# Patient Record
Sex: FEMALE | Race: WHITE
[De-identification: names, ages, dates, MRNs, and addresses within clinical notes are randomized per-mention and may not be internally consistent; named-entity substitution may affect disease eponyms.]

---

## 2016-12-26 VITALS
SYSTOLIC BLOOD PRESSURE: 124 MMHG | DIASTOLIC BLOOD PRESSURE: 68 MMHG | DIASTOLIC BLOOD PRESSURE: 68 MMHG | SYSTOLIC BLOOD PRESSURE: 124 MMHG

## 2017-03-27 ENCOUNTER — HOSPITAL ENCOUNTER (OUTPATIENT)
Dept: HOSPITAL 44 - LAB | Age: 82
End: 2017-03-27
Attending: INTERNAL MEDICINE
Payer: MEDICARE

## 2017-03-27 DIAGNOSIS — E11.9: Primary | ICD-10-CM

## 2017-03-27 LAB
EGFR (AFRICAN): > 60
EGFR (NON-AFRICAN): > 60

## 2017-03-27 PROCEDURE — 83036 HEMOGLOBIN GLYCOSYLATED A1C: CPT

## 2017-03-27 PROCEDURE — 36415 COLL VENOUS BLD VENIPUNCTURE: CPT

## 2017-03-27 PROCEDURE — 80048 BASIC METABOLIC PNL TOTAL CA: CPT

## 2017-06-02 ENCOUNTER — HOSPITAL ENCOUNTER (OUTPATIENT)
Dept: HOSPITAL 44 - LAB | Age: 82
End: 2017-06-02
Attending: INTERNAL MEDICINE
Payer: MEDICARE

## 2017-06-02 DIAGNOSIS — R10.84: Primary | ICD-10-CM

## 2017-06-02 DIAGNOSIS — K21.0: ICD-10-CM

## 2017-06-02 DIAGNOSIS — I48.2: ICD-10-CM

## 2017-06-02 DIAGNOSIS — M15.9: ICD-10-CM

## 2017-06-02 DIAGNOSIS — I25.9: ICD-10-CM

## 2017-06-02 LAB
BASOPHILS NFR BLD: 0.7 % (ref 0–1.5)
EGFR (AFRICAN): > 60
EGFR (NON-AFRICAN): > 60
EOSINOPHIL NFR BLD: 2.1 % (ref 0–6.8)
MCH RBC QN AUTO: 32 PG (ref 28–34)
MCV RBC AUTO: 92.6 FL (ref 80–100)
MONOCYTES %: 5.5 % (ref 0–11)
NEUTROPHILS #: 3.8 # K/UL (ref 1.4–7.7)

## 2017-06-02 PROCEDURE — 80061 LIPID PANEL: CPT

## 2017-06-02 PROCEDURE — 85025 COMPLETE CBC W/AUTO DIFF WBC: CPT

## 2017-06-02 PROCEDURE — 84443 ASSAY THYROID STIM HORMONE: CPT

## 2017-06-02 PROCEDURE — 83880 ASSAY OF NATRIURETIC PEPTIDE: CPT

## 2017-06-02 PROCEDURE — 36415 COLL VENOUS BLD VENIPUNCTURE: CPT

## 2017-06-02 PROCEDURE — 80053 COMPREHEN METABOLIC PANEL: CPT

## 2017-08-23 ENCOUNTER — HOSPITAL ENCOUNTER (EMERGENCY)
Dept: HOSPITAL 44 - ED | Age: 82
Discharge: HOME | End: 2017-08-23
Payer: MEDICARE

## 2017-08-23 VITALS — SYSTOLIC BLOOD PRESSURE: 155 MMHG | DIASTOLIC BLOOD PRESSURE: 58 MMHG
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
## 2017-08-23 DIAGNOSIS — S62.645B: Primary | ICD-10-CM

## 2017-08-23 PROCEDURE — S1016 NON-PVC INTRAVENOUS ADMINIST: HCPCS

## 2017-08-23 PROCEDURE — 12001 RPR S/N/AX/GEN/TRNK 2.5CM/<: CPT

## 2017-08-23 PROCEDURE — 99283 EMERGENCY DEPT VISIT LOW MDM: CPT

## 2017-08-23 PROCEDURE — 96365 THER/PROPH/DIAG IV INF INIT: CPT

## 2017-08-23 PROCEDURE — 73140 X-RAY EXAM OF FINGER(S): CPT

## 2017-08-23 PROCEDURE — 90471 IMMUNIZATION ADMIN: CPT

## 2017-08-23 NOTE — DIAGNOSTIC IMAGING REPORT
CYNTHIA STEINBERG (ZHANNA) - ER 

[facility]

[address] [facility].[address] [address]

 

 

 

 

Report Submission Date: Aug 23, 2017 8:06:53 PM CDT

Patient       Study

Name:   ANASTACIA RONDON       Date:   Aug 23, 2017 7:39:03 PM CDT

MRN:   [id number]       Modality Type:   CR

Gender:   F       Description:   UPPER EXTREMITY

:   39       Institution:   [facility]

Physician:   CYNTHIA STEINBERG (ZHANNA) - ER

         

 

 

Examination: Plain film finger 



History: Injury 



Comparison exams: None available 



Findings: 3 views the finger demonstrates a transverse lucency involving the 
distal phalanx of the 4th digit. Articular degenerative changes. No other acute 
osseous or soft tissue abnormality. 



Impression: Transverse fracture distal phalanx 4th digit.

 

Electronically signed on Aug 23, 2017 8:06:53 PM CDT by:

Carlos RUST

## 2017-08-23 NOTE — ED PHYSICIAN DOCUMENTATION
Upper Extremity Injury





- HISTORIAN


Historian: patient, child





- HPI


Stated Complaint: Left ring finger laceration


Chief Complaint: Upper Extremity Injury


Onset: just prior to arrival


Context: blow


Modifying Factors: pain on movement


Further Comments: yes (82 year old female patient brought in by family for 

evaluation of left ring finger.  Family reports patient closed finger in car 

door.  C/O severe pain.  Last tetanus 4/2016.)





- ROS


CONST: recent illness (shoulder injury)


CVS/RESP: none


NEURO: none


MS/SKIN/LYMPH: none


GI/: denies: nausea, vomiting





- PAST HX


Past History: Rt handed, diabetes Type 2, other (OA, COPD, GERD)


Allergies/Adverse Reactions: 


 Allergies











Allergy/AdvReac Type Severity Reaction Status Date / Time


 


No Known Allergies Allergy   Verified 08/23/17 20:28














Home Medications: 


 Ambulatory Orders











 Medication  Instructions  Recorded


 


Multivitamin [Tab-A-Chandan] 1 each PO DAILY 01/24/15


 


Budesonide [Pulmicort] 0.5 mg IH D 07/26/15


 


Pramipexole Di-HCl [Mirapex] 0.25 mg PO BID 07/26/15


 


Ranitidine HCl [Zantac] 150 mg PO D 07/26/15


 


Folic Acid [FA-8] 0.8 mg PO D 02/22/16


 


Metformin HCl [Metformin HCl ER] 1,000 mg PO HS 12/26/16


 


Atorvastatin Calcium [Lipitor] 20 mg PO HS 08/23/17


 


Cephalexin [Keflex] 500 mg PO QID #40 capsule 08/23/17


 


Cyanocobalamin [Vitamin B-12] 1,000 mcg PO BID 08/23/17


 


Gabapentin [Neurontin] 300 mg PO HS 08/23/17


 


Metformin HCl [Glucophage] 500 mg PO BID 08/23/17


 


Mupirocin [Bactroban] 1 appl TP BID #1 tube 08/23/17














- SOCIAL HX


Smoking History: non-smoker





- FAMILY HX


Family History: denies: none





- VITAL SIGNS


Vital Signs: 





 Vital Signs











Temp Pulse Resp BP Pulse Ox


 


    86   22   155/58   96 


 


    08/23/17 21:15  08/23/17 21:15  08/23/17 21:15  08/23/17 21:15














- REVIEWED ASSESSMENTS


Nursing Assessment  Reviewed: Yes


Vitals Reviewed: Yes





Procedures


Wound Location: other (right 4th digit)


Wound Length: 2


Wound's Depth, Shape: other ("C" shape)


Wound Explored: clean


Irrigated w/ Saline (ccs): 1,000


Betadine Prep?: No (chlorhexidine)


Anesthesia: 1% Lidocaine (digital block at 1915), Other (2030 - Marcaine .5% 

digital block )


Suture Size/Type: 5:0


Number of Sutures: 6


Deep Layer Suture Size/Type: 5:0


Sterile Dressing Applied?: Yes


Splint Applied?: Yes


Progress: 





2010 Consult with [facility] Dr Griffin, agrees with wash out, 

antibiotics, closure and splint.  Have patient follow up on Monday at 8:30 in 

office. 





Patient tolerated procedure well, flexion and extension intact at DIP joint of 

4th phalanx.  No tendons visualized. 





Edges well approximated. Dressing and splint applied by nursing.  Reviewed 

wound care instructions with patient and family .





ED Results Lab/Radiology





- Radiology


Radiology Impressions: 





 


Examination: Plain film finger





History: Injury





Comparison exams: None available





Findings: 3 views the finger demonstrates a transverse lucency involving the 

distal phalanx of the 4th digit. Articular degenerative changes. No other acute 

osseous or soft tissue abnormality.





Impression: Transverse fracture distal phalanx 4th digit.


 


Electronically signed on Aug 23, 2017 8:06:53 PM CDT by:


Carlos Mckeon





- Orders


Orders: 





 ED Orders











 Category Date Time Status


 


 Cleanse with NS and Chlorhexid 1T Care  08/23/17 19:10 Active


 


 FINGER 2 VIEWS OR MORE [RAD] Stat Exams  08/23/17 Completed


 


 Bupivacaine HCl/Pf [Marcaine 0.5%] Med  08/23/17 20:17 Discontinued





 5 mg IJ NOW ONE   


 


 Diph,Pertuss(Acell),Tet Vac/Pf [Adacel] Med  08/23/17 19:59 Discontinued





 0.5 ml IM .ONCE ONE   


 


 Lidocaine 1% 5ml(IM or SUTURE) [Xylocaine] Med  08/23/17 19:09 Discontinued





 50 mg IJ NOW ONE   


 


 Sodium Bicarbonate [Neut] Med  08/23/17 19:10 Discontinued





 2.4 meq INJ NOW ONE   


 


 ceFAZolin SODIUM [Ancef] 1 gm Med  08/23/17 19:59 Discontinued





 0.9 % Sodium Chloride [Sodium Chloride] 50 ml   





 IV NOW   














Upper Extremity Injury Physic





- Physical Exam


General Appearance: moderate distress


Hand: normal ROM, abrasions (Abrasion proximal to left 4th digit nail bed, 

volar aspect.), laceration (palmar aspect of left 4th digit.  ), stiffness, 

swelling


Wrist: normal inspection, non-tender, no evidence of injury, normal ROM


Elbow/Forearm: normal inspection, non-tender, no evidence of injury, normal ROM


Neuro/Vascular/Tendon: no vascular compromise, motor nml, sensation nml, ROM nml


Skin: warm,dry


Resp/CVS: chest non-tender, breath sounds nml, heart sounds nml, no resp. 

distress, lungs clear, reg. rate & rhythm


Abdomen: non-tender, pelvis stable





Discharge


Clincal Impression: 


Phalanx, distal fracture of finger


Qualifiers:


 Encounter type: initial encounter Finger: ring finger Fracture type: open 

Fracture alignment: nondisplaced Laterality: left Qualified Code(s): S62.665B - 

Nondisplaced fracture of distal phalanx of left ring finger, initial encounter 

for open fracture





Clincal Impression: 


 (Ruled Out): Closed fracture of phalanx of digit of hand


Prescriptions: 


Cephalexin [Keflex] 500 mg PO QID #40 capsule


Mupirocin [Bactroban] 1 appl TP BID #1 tube


Additional Instructions: 





Follow up Monday, September 4, 2017 at 8:30 with Dr Griffin





 Keep the wound clean and dry until it has healed.  





You can wash or shower after 24 hours.  Do not soak the wound in water and make 

sure it is dry afterwards (gently pat the area dry with a clean towel). Do not 

get into a swimming pool, hot tub, lake or river until your stitches are 

removed. 





 To remove your dressing,  gently pull it off.  If needed,  you can dampen it 

with water then gently pull it off. 





   Clean the laceration twice a day with hibiclens and rinse with water  clean 

away any scabbed area


   Apply thin coat of antibiotic ointment after cleaning the wound.


   Cover with non-adherent bandage if able.


 If you have pain, take simple pain relief medication such as Tylenol or 

ibuprofen.





 If bandages or dressings get wet, they will need to be changed. 





Have  your stitches removed at your doctors office in 7-10 days. 





Discharged with bactroban ointment  apply a thin coat twice a day. A 

prescription was sent to the pharmacy. 





Home Medications: 


Ambulatory Orders





Multivitamin [Tab-A-Chandan] 1 each PO DAILY 01/24/15 


Budesonide [Pulmicort] 0.5 mg IH D 07/26/15 


Pramipexole Di-HCl [Mirapex] 0.25 mg PO BID 07/26/15 


Ranitidine HCl [Zantac] 150 mg PO D 07/26/15 


Folic Acid [FA-8] 0.8 mg PO D 02/22/16 


Metformin HCl [Metformin HCl ER] 1,000 mg PO HS 12/26/16 


Atorvastatin Calcium [Lipitor] 20 mg PO HS 08/23/17 


Cephalexin [Keflex] 500 mg PO QID #40 capsule 08/23/17 


Cyanocobalamin [Vitamin B-12] 1,000 mcg PO BID 08/23/17 


Gabapentin [Neurontin] 300 mg PO HS 08/23/17 


Metformin HCl [Glucophage] 500 mg PO BID 08/23/17 


Mupirocin [Bactroban] 1 appl TP BID #1 tube 08/23/17 








Condition: Good


Disposition: 01 HOME, SELF-CARE


Decision to Admit: NO


Decision Time: 21:00

## 2017-11-04 ENCOUNTER — HOSPITAL ENCOUNTER (EMERGENCY)
Dept: HOSPITAL 44 - ED | Age: 82
Discharge: HOME | End: 2017-11-04
Payer: MEDICARE

## 2017-11-04 VITALS — DIASTOLIC BLOOD PRESSURE: 55 MMHG | SYSTOLIC BLOOD PRESSURE: 131 MMHG
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
## 2017-11-04 DIAGNOSIS — M25.512: Primary | ICD-10-CM
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
## 2017-11-04 DIAGNOSIS — M25.552: ICD-10-CM
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
## 2017-11-04 PROCEDURE — 99283 EMERGENCY DEPT VISIT LOW MDM: CPT

## 2017-11-04 NOTE — ED PHYSICIAN DOCUMENTATION
Fall





- HISTORIAN


Historian: patient, child





- HPI


Stated Complaint: fall/arm,thigh pain


Chief Complaint: Fall


Additional Information: 





pt went to sleep in chair awoke when stood up fell forward w/pain lt shoulder 

and lt hip.  had shoulder replacement this shoulder 6 weeks ago had been doingl 

well  had total llhip rt hip sev yrs ago.  had not lstood since fall ambulance 

delivery to [facility] at daughters request


Onset: just prior to arrival (approxd 1230), today


Where: home


Context: lost balance


r: mild


Associated Symptoms:: no loss of consciousness


Location of Pain/Injury: lower extremity.  denies: head, neck, face, chest, 

abdomen, upper back, mid back, lower back, L shoulder


Injury to Right Extremity: none


Injury to Left Extremity: shoulder, thigh





- ROS


CONST: no problems.  denies: recent illness, fever, sweating, weakness


NEURO: denies: dizziness, anxiety, depression


MS/SKIN/LYMPH: denies: weakness, numbness, neck pain, back pain, ankle swelling

, leg swelling


EYES/ENT: denies: problems with vision


CVS/RESP: none


GI/: denies: nausea





- PAST HX


Past History: cardiac disease, diabetes Type 1


Allergies/Adverse Reactions: 


 Allergies











Allergy/AdvReac Type Severity Reaction Status Date / Time


 


No Known Allergies Allergy   Verified 11/04/17 01:32














Home Medications: 


 Ambulatory Orders











 Medication  Instructions  Recorded


 


Multivitamin [Tab-A-Chandan] 1 each PO DAILY 01/24/15


 


Pramipexole Di-HCl [Mirapex] 0.25 mg PO BID 07/26/15


 


Ranitidine HCl [Zantac] 150 mg PO D 07/26/15


 


Folic Acid [FA-8] 0.8 mg PO D 02/22/16


 


Metformin HCl [Metformin HCl ER] 1,000 mg PO HS 12/26/16


 


Atorvastatin Calcium [Lipitor] 20 mg PO HS 08/23/17


 


Cyanocobalamin [Vitamin B-12] 1,000 mcg PO BID 08/23/17


 


Gabapentin [Neurontin] 300 mg PO HS 08/23/17


 


Mupirocin [Bactroban] 1 appl TP BID #1 tube 08/23/17














- SOCIAL HX


Smoking History: less than 1 pack/day


Alcohol Use: none


Drug Use: none





- FAMILY HX


Family History: no significant history





- VITAL SIGNS


Vital Signs: 





 Vital Signs











Temp Pulse Resp BP Pulse Ox


 


 98.2 F   76   16   131/55   95 


 


 11/04/17 01:25  11/04/17 01:25  11/04/17 01:25  11/04/17 01:25  11/04/17 01:25














- REVIEWED ASSESSMENTS


Nursing Assessment  Reviewed: Yes


Vitals Reviewed: Yes





Fall Physical Exam





- Physical Exam


General Appearance: mild distress


Head: non-tender, no swelling, no obvious injury


Neck: non-tender, painless ROM


Eye: DUSTIN, EOMI


ENT: nml external inspection


Resp/CVS: chest non-tender, breath sounds nml, heart sounds nml


Abdomen: soft, non-tender


Neuro: oriented x3, sensation nml, motor nml, mood/affect nml, other (pt can 

rom lt shoulder w/o difficulty and palpation is non tender and non remarkable 

also lt hip is non tende and can stand and walk "as good as usual" w/o sig pain 

anywhere)





Discharge


Clincal Impression: 


 fall w/ltshoulder and lt hip pain





Referrals: 


Johnnie Garcia MD [Primary Care Provider] - 2 Days


Comments: 





disc cond w/pt and quintin after exam they elect no xray at this time.  pt walked 

out of here w/o difficulty.  she walks part time w/walker at home.  daughter 

helped the pt by arm


Condition: Good


Disposition: 01 HOME, SELF-CARE


Decision to Admit: [id number]


Decision Time: 01:59

## 2017-11-12 VITALS — SYSTOLIC BLOOD PRESSURE: 156 MMHG | DIASTOLIC BLOOD PRESSURE: 75 MMHG

## 2017-11-14 ENCOUNTER — HOSPITAL ENCOUNTER (OUTPATIENT)
Dept: HOSPITAL 44 - RAD | Age: 82
End: 2017-11-14
Attending: FAMILY MEDICINE
Payer: MEDICARE

## 2017-11-14 DIAGNOSIS — E03.9: ICD-10-CM

## 2017-11-14 DIAGNOSIS — I48.2: ICD-10-CM

## 2017-11-14 DIAGNOSIS — R10.11: Primary | ICD-10-CM

## 2017-11-14 DIAGNOSIS — R10.84: ICD-10-CM

## 2017-11-14 DIAGNOSIS — J44.9: ICD-10-CM

## 2017-11-14 DIAGNOSIS — K21.0: ICD-10-CM

## 2017-11-14 DIAGNOSIS — I10: ICD-10-CM

## 2017-11-14 LAB
BASOPHILS NFR BLD: 0.6 % (ref 0–1.5)
EGFR (AFRICAN): > 60
EGFR (NON-AFRICAN): > 60
EOSINOPHIL NFR BLD: 5.2 % (ref 0–6.8)
IRON SERPL-MCNC: 34 UG/DL (ref 37–145)
MCH RBC QN AUTO: 29 PG (ref 28–34)
MCV RBC AUTO: 89.8 FL (ref 80–100)
MONOCYTES %: 4.6 % (ref 0–11)
NEUTROPHILS #: 3.8 # K/UL (ref 1.4–7.7)

## 2017-11-14 PROCEDURE — 84443 ASSAY THYROID STIM HORMONE: CPT

## 2017-11-14 PROCEDURE — 36415 COLL VENOUS BLD VENIPUNCTURE: CPT

## 2017-11-14 PROCEDURE — 85025 COMPLETE CBC W/AUTO DIFF WBC: CPT

## 2017-11-14 PROCEDURE — 83540 ASSAY OF IRON: CPT

## 2017-11-14 PROCEDURE — 80053 COMPREHEN METABOLIC PANEL: CPT

## 2017-11-14 PROCEDURE — 83690 ASSAY OF LIPASE: CPT

## 2017-11-14 PROCEDURE — 76705 ECHO EXAM OF ABDOMEN: CPT

## 2017-11-14 PROCEDURE — 83550 IRON BINDING TEST: CPT

## 2017-11-14 NOTE — DIAGNOSTIC IMAGING REPORT
PABLO RIVER 

[facility]

[address] [facility] [address] [address]

 

 

 

 

Report Submission Date: 2017 9:11:46 AM CST

Patient       Study

Name:   ANASTACIA RONDON       Date:   2017 8:04:32 AM CST

MRN:   [id number]       Modality Type:   US

Gender:   F       Description:   US ABD LIMITED

:   39       Institution:   [facility]

Physician:   PABLO RIVER

     Accession:    [id number]

 

 

Examination: Ultrasound gallbladder 



History: Distended gallbladder. 



Comparison exam: CT scan dated 10 November 2017 



Findings: Sonographic evaluation of the right upper quadrant demonstrates the 
gallbladder without stones or sludge. Gallbladder wall measures 2.6 mm. Common 
bile duct measures 5.6 mm. No intrahepatic biliary dilation.  Liver 
demonstrates normal homogeneous echogenicity. No mass or cyst. Normal flow on 
color analysis. Normal Doppler waveforms. 



Right kidney measures 10.9 cm in length.  No cortical mass or cyst. No 
hydronephrosis. Pancreatic region without gross irregularity. 



Impression: No gallstone or obstruction. Unremarkable abdominal ultrasound.

 

Electronically signed on 2017 9:11:46 AM CST by:

Carlos RUST

## 2017-11-17 ENCOUNTER — HOSPITAL ENCOUNTER (INPATIENT)
Dept: HOSPITAL 44 - ED | Age: 82
LOS: 2 days | Discharge: HOME | DRG: 191 | End: 2017-11-19
Attending: PHYSICIAN ASSISTANT | Admitting: PHYSICIAN ASSISTANT
Payer: MEDICARE

## 2017-11-17 VITALS — BODY MASS INDEX: 29.2 KG/M2

## 2017-11-17 DIAGNOSIS — R41.82: ICD-10-CM

## 2017-11-17 DIAGNOSIS — E11.9: ICD-10-CM

## 2017-11-17 DIAGNOSIS — E87.1: ICD-10-CM

## 2017-11-17 DIAGNOSIS — I50.9: ICD-10-CM

## 2017-11-17 DIAGNOSIS — F17.210: ICD-10-CM

## 2017-11-17 DIAGNOSIS — E78.5: ICD-10-CM

## 2017-11-17 DIAGNOSIS — D64.9: ICD-10-CM

## 2017-11-17 DIAGNOSIS — J44.9: Primary | ICD-10-CM

## 2017-11-17 LAB
BASOPHILS NFR BLD: 0.9 % (ref 0–1.5)
EGFR (AFRICAN): > 60
EGFR (NON-AFRICAN): > 60
EOSINOPHIL NFR BLD: 4.4 % (ref 0–6.8)
MCH RBC QN AUTO: 28.4 PG (ref 28–34)
MCV RBC AUTO: 88.2 FL (ref 80–100)
MONOCYTES %: 5.6 % (ref 0–11)
NEUTROPHILS #: 3.6 # K/UL (ref 1.4–7.7)

## 2017-11-17 PROCEDURE — 80048 BASIC METABOLIC PNL TOTAL CA: CPT

## 2017-11-17 PROCEDURE — 93005 ELECTROCARDIOGRAM TRACING: CPT

## 2017-11-17 PROCEDURE — 85027 COMPLETE CBC AUTOMATED: CPT

## 2017-11-17 PROCEDURE — 99238 HOSP IP/OBS DSCHRG MGMT 30/<: CPT

## 2017-11-17 PROCEDURE — S1016 NON-PVC INTRAVENOUS ADMINIST: HCPCS

## 2017-11-17 PROCEDURE — 80053 COMPREHEN METABOLIC PANEL: CPT

## 2017-11-17 PROCEDURE — 85025 COMPLETE CBC W/AUTO DIFF WBC: CPT

## 2017-11-17 PROCEDURE — 85730 THROMBOPLASTIN TIME PARTIAL: CPT

## 2017-11-17 PROCEDURE — 83880 ASSAY OF NATRIURETIC PEPTIDE: CPT

## 2017-11-17 PROCEDURE — 36415 COLL VENOUS BLD VENIPUNCTURE: CPT

## 2017-11-17 PROCEDURE — 71010: CPT

## 2017-11-17 PROCEDURE — 99222 1ST HOSP IP/OBS MODERATE 55: CPT

## 2017-11-17 PROCEDURE — 99291 CRITICAL CARE FIRST HOUR: CPT

## 2017-11-17 PROCEDURE — 81002 URINALYSIS NONAUTO W/O SCOPE: CPT

## 2017-11-17 PROCEDURE — 83605 ASSAY OF LACTIC ACID: CPT

## 2017-11-17 PROCEDURE — 84484 ASSAY OF TROPONIN QUANT: CPT

## 2017-11-17 PROCEDURE — 99283 EMERGENCY DEPT VISIT LOW MDM: CPT

## 2017-11-17 PROCEDURE — 85610 PROTHROMBIN TIME: CPT

## 2017-11-17 PROCEDURE — 83690 ASSAY OF LIPASE: CPT

## 2017-11-17 PROCEDURE — 99284 EMERGENCY DEPT VISIT MOD MDM: CPT

## 2017-11-18 LAB
APPEARANCE UR: CLEAR
COLOR,URINE: YELLOW
EGFR (AFRICAN): > 60
EGFR (NON-AFRICAN): > 60
PH UR STRIP: 5.5 [PH] (ref 5–8)
RBC UR QL: NEGATIVE
UROBILINOGEN URINE: 0.2 EU (ref 0.2–1)

## 2017-11-18 RX ADMIN — BUDESONIDE SCH MG: 0.5 SUSPENSION RESPIRATORY (INHALATION) at 21:09

## 2017-11-18 RX ADMIN — FLUDROCORTISONE ACETATE SCH MG: 0.1 TABLET ORAL at 09:02

## 2017-11-18 RX ADMIN — IPRATROPIUM BROMIDE AND ALBUTEROL SULFATE SCH ML: .5; 3 SOLUTION RESPIRATORY (INHALATION) at 09:22

## 2017-11-18 RX ADMIN — LEVOTHYROXINE SODIUM SCH: 100 TABLET ORAL at 06:00

## 2017-11-18 RX ADMIN — LEVOTHYROXINE SODIUM SCH: 100 TABLET ORAL at 00:16

## 2017-11-18 RX ADMIN — BUDESONIDE SCH MG: 0.5 SUSPENSION RESPIRATORY (INHALATION) at 09:33

## 2017-11-18 RX ADMIN — FLUDROCORTISONE ACETATE SCH: 0.1 TABLET ORAL at 00:16

## 2017-11-18 RX ADMIN — ENOXAPARIN SODIUM SCH MG: 30 INJECTION SUBCUTANEOUS at 00:25

## 2017-11-18 RX ADMIN — IPRATROPIUM BROMIDE AND ALBUTEROL SULFATE SCH ML: .5; 3 SOLUTION RESPIRATORY (INHALATION) at 17:55

## 2017-11-18 RX ADMIN — RETINOL, ERGOCALCIFEROL, .ALPHA.-TOCOPHEROL ACETATE, DL-, PHYTONADIONE, ASCORBIC ACID, NIACINAMIDE, RIBOFLAVIN 5-PHOSPHATE SODIUM, THIAMINE HYDROCHLORIDE, PYRIDOXINE HYDROCHLORIDE, DEXPANTHENOL, BIOTIN, FOLIC ACID, AND CYANOCOBALAMIN SCH MLS/HR: KIT at 07:43

## 2017-11-18 RX ADMIN — FOLIC ACID SCH MG: 1 TABLET ORAL at 09:03

## 2017-11-18 RX ADMIN — RETINOL, ERGOCALCIFEROL, .ALPHA.-TOCOPHEROL ACETATE, DL-, PHYTONADIONE, ASCORBIC ACID, NIACINAMIDE, RIBOFLAVIN 5-PHOSPHATE SODIUM, THIAMINE HYDROCHLORIDE, PYRIDOXINE HYDROCHLORIDE, DEXPANTHENOL, BIOTIN, FOLIC ACID, AND CYANOCOBALAMIN SCH: KIT at 23:30

## 2017-11-18 RX ADMIN — CYANOCOBALAMIN TAB 1000 MCG SCH MCG: 1000 TAB at 09:03

## 2017-11-18 RX ADMIN — CYANOCOBALAMIN TAB 1000 MCG SCH MCG: 1000 TAB at 21:04

## 2017-11-18 RX ADMIN — IPRATROPIUM BROMIDE AND ALBUTEROL SULFATE SCH ML: .5; 3 SOLUTION RESPIRATORY (INHALATION) at 21:06

## 2017-11-18 RX ADMIN — MULTIVITAMIN TABLET SCH EACH: TABLET at 09:03

## 2017-11-18 RX ADMIN — RETINOL, ERGOCALCIFEROL, .ALPHA.-TOCOPHEROL ACETATE, DL-, PHYTONADIONE, ASCORBIC ACID, NIACINAMIDE, RIBOFLAVIN 5-PHOSPHATE SODIUM, THIAMINE HYDROCHLORIDE, PYRIDOXINE HYDROCHLORIDE, DEXPANTHENOL, BIOTIN, FOLIC ACID, AND CYANOCOBALAMIN SCH: KIT at 00:16

## 2017-11-18 RX ADMIN — IPRATROPIUM BROMIDE AND ALBUTEROL SULFATE SCH: .5; 3 SOLUTION RESPIRATORY (INHALATION) at 00:15

## 2017-11-18 RX ADMIN — IPRATROPIUM BROMIDE AND ALBUTEROL SULFATE SCH ML: .5; 3 SOLUTION RESPIRATORY (INHALATION) at 04:44

## 2017-11-18 RX ADMIN — IPRATROPIUM BROMIDE AND ALBUTEROL SULFATE SCH: .5; 3 SOLUTION RESPIRATORY (INHALATION) at 00:56

## 2017-11-18 RX ADMIN — IPRATROPIUM BROMIDE AND ALBUTEROL SULFATE SCH ML: .5; 3 SOLUTION RESPIRATORY (INHALATION) at 12:50

## 2017-11-18 RX ADMIN — FOLIC ACID SCH: 1 TABLET ORAL at 00:16

## 2017-11-18 NOTE — HISTORY AND PHYSICAL REPORT
History of Present Illnes





- History of Present Illness


Reason for Visit: found down


History of Present Illness: 





Pat is an 81yo woman with a history of multiple medical problems 

including heart disease, DM, COPD, h/o pancreatitis and chronic hyponatremia.  

Her daughter found her on the floor by her chair yesterday after Pat 

failed to answer the phone multiple times.  Pat was transferred to [facility] 

where she was somnolent and found to have sodium level of sodium level of 126, 

it usually hovers around 130.  WBC count was normal and chest Xray was clear.  

She was admitted to the floor and put on oral fluid restrictions, IV normal 

saline, and Solu-Cortef.  Currently she reports feeling fine.  She has no 

memory of the events preceding her hospitalization yesterday.  She denies pain.





- Past Medical History


Cardiac: CAD, HTN, Valve insufficiency


Pulmonary: COPD


CNS: Peripheral neuropathy


Renal/: Other (chronic hyponatremia)


Endocrine: Diabetes





- Past Surgical History


Past Surgical History: Appendectomy, Hysterectomy (total), Total Hip Replacement

, Tonsillectomy, Other (AP Bladder suspension, PCI with stenting, nerve 

stimulator implantation, total shoulder replacement)





- Past Social History


Smoke: <1 pack per day


Alcohol: None


Drugs: None


Lives: Alone


Domestic Violence: Negative





- Health Maintenance


Health Maintenance: Cholesterol, Influenza Vaccine, Pneumococcal Vaccine, 

Mammogram


Pneumonia Vaccine: No (already had)


Resuscitation Status: 


Resusciation Status





Resuscitation Status             Full Code














Review of Systems





- Review of Systems


Constitutional: negative: Fever, Chills


Eyes: negative: pain, redness


ENT: negative: Ear Pain, Nose Pain


Respiratory: negative: Cough, Pleuritic Pain


Cardiovascular: negative: Chest Pain, Edema


Gastrointestinal: negative: Nausea, Abdominal Pain


Genitourinary: negative: Dysuria, Incontinence


Musculoskeletal: negative: Neck Pain, Back Pain


Skin: negative: Rash, Lesions


Neurological: Weakness.  negative: Change in Speech





- Medications/Allergies


Allergies/Adverse Reactions: 


 Allergies











Allergy/AdvReac Type Severity Reaction Status Date / Time


 


No Known Allergies Allergy   Verified 11/17/17 20:21














Current Inpatient Medications: 


 Current Inpatient Medications





Albuterol/Ipratropium (Duoneb)  3 ml NEB Q4 TIANA


   Last Admin: 11/18/17 04:44 Dose:  3 ml


Atorvastatin Calcium (Lipitor)  20 mg PO HS TIANA


Budesonide (Pulmicort)  0.5 mg NEB BID TIANA


Citalopram Hydrobromide (Celexa)  10 mg PO NOW ONE


   Stop: 11/17/17 23:53


   Last Admin: 11/18/17 00:25 Dose:  10 mg


Cyanocobalamin (Vitamin B-12)  1,000 mcg PO BID [facility]


Enoxaparin Sodium (Lovenox)  30 mg SQ QD [facility]


   Stop: 11/30/17 23:53


   Last Admin: 11/18/17 00:25 Dose:  30 mg


Fludrocortisone Acetate (Florinef)  0.1 mg PO DAILY [facility]


   Last Admin: 11/18/17 00:16 Dose:  Not Given


Folic Acid (Folvite)  1 mg PO DAILY [facility]


   Last Admin: 11/18/17 00:16 Dose:  Not Given


Gabapentin (Neurontin)  300 mg PO [facility]


Sodium Chloride (Normal Saline)  1,000 mls @ 50 mls/hr IV Q10H [facility]


   Last Admin: 11/18/17 07:43 Dose:  50 mls/hr


Levothyroxine Sodium (Synthroid)  112 mcg PO 0700 [facility]


   Last Admin: 11/18/17 06:00 Dose:  Not Given


Metformin HCl (Glucophage)  1,000 mg PO [id number] [facility]


   Last Admin: 11/18/17 07:41 Dose:  1,000 mg


Miscellaneous (Umeclidinium Brm/Vilanterol Tr [Anoro Ellipta 62.5-25 Mcg Inh])  

1 puff IH DAILY [facility]


Multivitamins (Tab-A-Chandan)  1 each PO DAILY [facility]














Exam





- Exam


Vital Signs: 


 Vital Signs (72 hours)











  11/17/17 11/17/17 11/18/17





  23:51 23:52 00:34


 


Temperature 98.5 F  


 


Pulse Rate  87 71


 


Pulse Rate [ 67  





Left Pulse ox]   


 


Respiratory 20  





Rate   


 


Blood Pressure 127/51  





[Right Arm]   


 


O2 Sat by Pulse 96 96 





Oximetry   














  11/18/17 11/18/17 11/18/17





  00:44 01:50 02:00


 


Temperature   97.9 F


 


Pulse Rate 72 70 


 


Pulse Rate [   72





Left Pulse ox]   


 


Respiratory   18





Rate   


 


Blood Pressure   112/53





[Right Arm]   


 


O2 Sat by Pulse   97





Oximetry   














  11/18/17 11/18/17 11/18/17





  02:25 03:47 03:48


 


Temperature   


 


Pulse Rate 68  67


 


Pulse Rate [   





Left Pulse ox]   


 


Respiratory   





Rate   


 


Blood Pressure   





[Right Arm]   


 


O2 Sat by Pulse  97 





Oximetry   














  11/18/17 11/18/17 11/18/17





  05:00 06:00 08:22


 


Temperature  97.1 F L 98.7 F


 


Pulse Rate 78 70 


 


Pulse Rate [  71 72





Left Pulse ox]   


 


Respiratory  20 16





Rate   


 


Blood Pressure  112/49 90/42





[Right Arm]   


 


O2 Sat by Pulse  96 97





Oximetry   














General: Alert, Oriented to Person, Oriented to Place, Oriented to Time, 

Cooperative, No acute distress


HEENT: Atraumatic, PERRLA, EOMI


Neck: No: Stridor, Rigidity


Lungs: Clear to auscultation.  No: Respiratory Distress


Cardiovascular: Regular rate, Normal S1, Normal S2


Abdomen: Soft, No tenderness


Integumentary: Normal, Pink, Warm, Dry


Extremities: No cyanosis, No edema, No tenderness/swelling


Neurological: Normal speech, Normal tone, Cranial nerves 3-12 NL


Psych/Mental Status: Mental status NL, Appropriate Affect





- Laboratory Results


Laboratory Results: 


 Laboratory Results











  11/18/17





  06:35


 


Sodium  130 L


 


Potassium  4.1


 


Chloride  98


 


Carbon Dioxide  26


 


BUN  25 H


 


Creatinine  1.00


 


Estimated Creat Clear  58


 


Est GFR ( Amer)  > 60


 


Est GFR (Non-Af Amer)  > 60


 


Glucose  122 H


 


Calcium  8.2 L

















Assessment/Plan





- Assessment/Plan


(1) Hyponatremia


Status: Acute   Current Visit: Yes   


Assessment: 


Na on admission 126, now 130


Plan: 


continue oral fluid restriction, IV fluid, and Solu-Cortef








(2) Altered mental status


Status: Acute   Current Visit: No   


Assessment: 


Pt was somnolent in ER, now AAOx3 although daughter says they just talked about 

the date.  Pt is sitting up eating breakfast.  Has good appetite.








(3) Anemia


Status: Chronic   Current Visit: No   


Qualifiers: 


   Anemia type: unspecified type   Qualified Code(s): D64.9 - Anemia, 

unspecified   


Plan: 


Hgb 11.6, will monitor, Pt stable








(4) Diabetes mellitus type 2


Status: Acute   Current Visit: No   


Plan: 


Continue Metformin








(5) Chronic obstructive pulmonary disease


Status: Acute   Current Visit: No   


Assessment: 


Stable


Plan: 


continue Duoneb and Pulmicort as ordered








VTE Assessment





- RISK FACTOR SCORE


VTE RISK FACTOR SCORES: AGE OVER 60 YEARS, SMOKER





- RISK


VTE MODERATE RISK: SCORE OF 2 (RISK PROXIMAL DVT 2-4%) PROPHYAXIS NEEDED

## 2017-11-18 NOTE — ED PHYSICIAN DOCUMENTATION
General Adult





- HISTORIAN


Historian: child





- HPI


Stated Complaint: soa with activity through out the day


Chief Complaint: General Adult


Additional Information: 





somnolent


Onset: other (today)


Timing: still present


Severity: moderate


Modifying Factors: hx of multiple medical problems


Further Comments: no


Last known Well Date: 11/16/17


Last Known Well Time: 20:00





- ROS


CONST: recent illness


EYES/ENT: none


CVS/RESP: shortness of breath (hx of copd)


GI/: none


MS/SKIN/LYMPH: none


NEURO/PSYCH: denies: headache, fainting, dizziness, tingling, numbness, 

difficulty walking, difficulty with speech, anxiety, depression





- PAST HX


Past History: COPD, other (chf, hyponatremia, gerd)


Other History: diabetes Type 2, other (depression, hyperlipidemia)


Surgeries/Procedures: other (see nurses notes)


Immunizations: referred to PCP


Allergies/Adverse Reactions: 


 Allergies











Allergy/AdvReac Type Severity Reaction Status Date / Time


 


No Known Allergies Allergy   Verified 11/17/17 20:21














Home Medications: 


 Ambulatory Orders











 Medication  Instructions  Recorded


 


Multivitamin [Tab-A-Chandan] 1 each PO DAILY 01/24/15


 


Pramipexole Di-HCl [Mirapex] 0.25 mg PO BID 07/26/15


 


Ranitidine HCl [Zantac] 150 mg PO D 07/26/15


 


Folic Acid [FA-8] 0.8 mg PO D 02/22/16


 


Metformin HCl [Metformin HCl ER] 1,000 mg PO HS 12/26/16


 


Atorvastatin Calcium [Lipitor] 20 mg PO HS 08/23/17


 


Cyanocobalamin [Vitamin B-12] 1,000 mcg PO BID 08/23/17


 


Gabapentin [Neurontin] 300 mg PO HS 08/23/17














- SOCIAL HX


Smoking History: non-smoker, quit greater than 1 year


Alcohol Use: none


Drug Use: none





- FAMILY HX


Family History: No





- VITAL SIGNS


Vital Signs: 





 Vital Signs











Temp Pulse Resp BP Pulse Ox


 


 97.1 F L  71   20   112/49   96 


 


 11/18/17 06:00  11/18/17 06:00  11/18/17 06:00  11/18/17 06:00  11/18/17 06:00














- REVIEWED ASSESSMENTS


Nursing Assessment  Reviewed: Yes


Vitals Reviewed: Yes





Progress





- Results/Orders


Results/Orders: 





labs and cxr ordered





- Progress


Progress: 





pt. treated with NS in ER





Critical Care Note





- Critical Care Note


Total Time (mins): 60





ED Results Lab/Radiology





- Lab Results


Lab Results: 





 Lab Results











  11/17/17 11/17/17 11/17/17





  21:53 21:18 21:18


 


WBC      7.00 K/ul K/ul





     (4.00-12.00) 


 


RBC      4.09 M/ul M/ul





     (3.90-5.20) 


 


Hgb      11.6 g/dL L g/dL





     (12.0-16.0) 


 


Hct      36.1 % %





     (34.5-46.5) 


 


MCV      88.2 fl fl





     (80.0-100.0) 


 


MCH      28.4 pg pg





     (28.0-34.0) 


 


MCHC      32.2 g/dL g/dL





     (30.0-36.0) 


 


RDW      14.1 % %





     (11.3-14.3) 


 


Plt Count      392 K/mm3 K/mm3





     (130-400) 


 


Neut % (Auto)      51.4 % %





     (39.0-79.0) 


 


Lymph % (Auto)      36.2 % %





     (16.0-50.0) 


 


Mono % (Auto)      5.6 % %





     (0.0-11.0) 


 


Eos % (Auto)      4.4 % %





     (0.0-6.8) 


 


Baso % (Auto)      0.9 





     (0.0-1.5) 


 


Neut # (Auto)      3.6 # k/uL # k/uL





     (1.4-7.7) 


 


Lymph # (Auto)      2.6 # k/uL # k/uL





     (0.6-4.0) 


 


Mono # (Auto)      0.4 # k/uL # k/uL





     (0.0-0.9) 


 


Eos # (Auto)      0.3 # k/uL # k/uL





     (0.0-0.6) 


 


Baso # (Auto)      0.1 # k/uL # k/uL





     (0.0-0.5) 


 


Reactive Lymphs %      1.5 % %





     (0.0-5.0) 


 


Reactive Lymphs #      0.1 # k/uL # k/uL





     (0.0-0.8) 


 


PT      





    


 


INR      





    


 


APTT      





    


 


Sodium    126 mmol/L L mmol/L  





    (136-145)  


 


Potassium    4.5 mmol/L mmol/L  





    (3.5-5.1)  


 


Chloride    88 mmol/L L mmol/L  





    ()  


 


Carbon Dioxide    30 mmol/L mmol/L  





    (22-30)  


 


BUN    27 mg/dL H mg/dL  





    (7-17)  


 


Creatinine    1.10 mg/dL H mg/dL  





    (0.52-1.04)  


 


Estimated Creat Clear    48   





    


 


Est GFR ( Amer)    > 60   





   (60 - ) 


 


Est GFR (Non-Af Amer)    > 60   





   (60 - ) 


 


Glucose    216 mg/dL H mg/dL  





    ()  


 


Lactate    1.6 U/L U/L  





    (0.7-2.1)  


 


Calcium    9.1 mg/dL mg/dL  





    (8.4-10.2)  


 


Total Bilirubin    0.2 mg/dL mg/dL  





    (0.2-1.3)  


 


AST    24 U/L U/L  





    (15-46)  


 


ALT    30 U/L U/L  





    (13-69)  


 


Alkaline Phosphatase    84 U/L U/L  





    ()  


 


Troponin I      





    


 


NT-Pro-B Natriuret Pep      





    


 


Total Protein    6.5 g/dL g/dL  





    (6.3-8.2)  


 


Albumin    3.5 g/dL g/dL  





    (3.5-5.0)  


 


Lipase  402 U/L H U/L    





   ()   














  11/17/17 11/17/17





  20:18 19:22


 


WBC    





   


 


RBC    





   


 


Hgb    





   


 


Hct    





   


 


MCV    





   


 


MCH    





   


 


MCHC    





   


 


RDW    





   


 


Plt Count    





   


 


Neut % (Auto)    





   


 


Lymph % (Auto)    





   


 


Mono % (Auto)    





   


 


Eos % (Auto)    





   


 


Baso % (Auto)    





   


 


Neut # (Auto)    





   


 


Lymph # (Auto)    





   


 


Mono # (Auto)    





   


 


Eos # (Auto)    





   


 


Baso # (Auto)    





   


 


Reactive Lymphs %    





   


 


Reactive Lymphs #    





   


 


PT  9.3 Seconds L Seconds  





   (9.4-11.6)  


 


INR  0.89  L   





   (0.9-1.2)  


 


APTT  25.0 Seconds Seconds  





   (24.5-32.8)  


 


Sodium    





   


 


Potassium    





   


 


Chloride    





   


 


Carbon Dioxide    





   


 


BUN    





   


 


Creatinine    





   


 


Estimated Creat Clear    





   


 


Est GFR ( Amer)    





   


 


Est GFR (Non-Af Amer)    





   


 


Glucose    





   


 


Lactate    





   


 


Calcium    





   


 


Total Bilirubin    





   


 


AST    





   


 


ALT    





   


 


Alkaline Phosphatase    





   


 


Troponin I    < 0.03 ng/mL L ng/mL





    (0.03-0.06) 


 


NT-Pro-B Natriuret Pep    605.0 pg/mL H pg/mL





    (15.0-450.0) 


 


Total Protein    





   


 


Albumin    





   


 


Lipase    





   














- Radiology


Radiology Impressions: 





cxr clear





- Orders


Orders: 





 ED Orders











 Category Date Time Status


 


 Activity as ordered D Care  11/17/17 23:52 Active


 


 Assess pulse oximetry Q4H Care  11/17/17 23:52 Active


 


 Continuous EKG monitoring Q1H Care  11/17/17 23:52 Active


 


 Document Bowel Movement Q8H Care  11/17/17 23:52 Active


 


 Dr. Garcia NOW Care  11/17/17 23:52 Ordered


 


 Further Nursing Orders [id number] Care  11/17/17 23:52 Active


 


 Further Nursing Orders D Care  11/17/17 23:52 Active


 


 Inpatient Telemetry NOW Care  11/17/17 23:52 Ordered


 


 Monitor for changes in mental  Q4 Care  11/17/17 23:52 Active


 


 Obtain weight DAILY@0500 Care  11/17/17 23:52 Active


 


 Place IV Lock 1T Care  11/17/17 21:06 Completed


 


 Vital Signs Q4 Care  11/17/17 23:52 Active


 


 No Concentrated Sweets Diet  11/17/17 Breakfast Ordered


 


 Regular Diet  11/17/17 Breakfast Ordered


 


 CHEST 1 VIEW [RAD] Routine Exams  11/17/17 Completed


 


 BMP Routine Lab  11/18/17 06:00 Ordered


 


 CBC/PLATELET/DIFF Routine Lab  11/17/17 21:18 Completed


 


 CMP Routine Lab  11/17/17 21:18 Completed


 


 LACTATE Routine Lab  11/17/17 21:18 Completed


 


 LIPASE Routine Lab  11/17/17 21:53 Completed


 


 NT-proBNP Routine Lab  11/17/17 19:22 Completed


 


 PT-INR Routine Lab  11/17/17 20:18 Completed


 


 PTT Routine Lab  11/17/17 20:18 Completed


 


 TROPONIN I (cTnI) Routine Lab  11/17/17 19:22 Completed


 


 0.9 % Sodium Chloride [Normal Saline] 1,000 ml Med  11/17/17 22:10 Discontinued





 IV .Q1H   


 


 0.9 % Sodium Chloride [Normal Saline] 1,000 ml Med  11/17/17 22:11 Discontinued





 IV .STK-MED   


 


 0.9 % Sodium Chloride [Normal Saline] 1,000 ml Med  11/17/17 23:52 Ordered





 IV Q10H   


 


 0.9 % Sodium Chloride [Normal Saline] 500 ml Med  11/17/17 21:06 Discontinued





 IV NOW   


 


 Atorvastatin Calcium [Lipitor] Med  11/18/17 21:00 Ordered





 20 mg PO HS   


 


 Budesonide [Pulmicort] Med  11/17/17 21:10 Discontinued





 0.5 mg NEB 1T ONE   


 


 Budesonide [Pulmicort] Med  11/18/17 09:00 Ordered





 0.5 mg NEB BID   


 


 Citalopram Hydrobromide [Celexa] Med  11/17/17 23:52 Once





 10 mg PO NOW ONE   


 


 Cyanocobalamin [Vitamin B-12] Med  11/18/17 09:00 Ordered





 1,000 mcg PO BID   


 


 Enoxaparin Sodium [Lovenox] Med  11/17/17 23:52 Ordered





 30 mg SQ QD   


 


 Fludrocortisone Acetate [Florinef] Med  11/17/17 23:52 Ordered





 0.1 mg PO DAILY   


 


 Folic Acid [Folvite] Med  11/17/17 23:52 Ordered





 1 mg PO DAILY   


 


 Gabapentin [Neurontin] Med  11/18/17 21:00 Ordered





 300 mg PO HS   


 


 Ipratropium/Albuterol Sulfate [Duoneb] Med  11/17/17 21:06 Discontinued





 3 ml NEB NOW ONE   


 


 Ipratropium/Albuterol Sulfate [Duoneb] Med  11/17/17 23:52 Ordered





 3 ml NEB Q4   


 


 Levothyroxine Sodium [Synthroid] Med  11/17/17 23:52 Ordered





 112 mcg PO 0700   


 


 Metformin HCl [Glucophage] Med  11/18/17 07:30 Ordered





 1,000 mg PO [id number]   


 


 Multivitamin [Tab-A-Chandan] Med  11/18/17 09:00 Ordered





 1 each PO DAILY   


 


 Umeclidinium Brm/Vilanterol Tr [Anoro Ellipta 62.5-25 Med  11/18/17 09:00 

Ordered





 Mcg INH]   





 1 puff IH DAILY   


 


 Resuscitation Status Routine Oth  11/17/17 23:52 Ordered


 


 Oxygen Daily Oxygen  11/17/17 23:52 Ordered


 


 EKG WITH COMPARISON Routine Ther  11/17/17 Stop Req


 


 Transfer Routine Transfer  11/17/17 Completed














General Adult Physical Exam





- PHYSICAL EXAM


GENERAL APPEARANCE: mild distress


EENT: eye inspection normal, ENT inspection normal, pharynx normal, no signs of 

dehydration, DUSTIN, no nystagmus, TM's nml


NECK: normal inspection, thyroid normal, supple


RESPIRATORY: no resp distress, chest non-tender, breath sounds normal


CVS: reg rate & rhythm, heart sounds normal, equal pulses


ABDOMEN: soft, no organomegaly, normal bowel sounds


BACK: normal inspection, no CVA tenderness


SKIN: warm/dry, normal color


EXTREMITIES: non-tender, normal range of motion, no evidence of injury, no edema


NEURO: other (somnolent)





Discharge


Clincal Impression: 


 Hyponatremia





Condition: Stable


Disposition: 09 ADMITTED AS INPATIENT


Decision to Admit: [id number]


Date of Decison to Admit: 11/17/17


Decision Time: 07:00

## 2017-11-18 NOTE — DIAGNOSTIC IMAGING REPORT
NIGEL PEARSON 

[facility]

[address] [facility] [address]. [address]

 

 

 

 

Report Submission Date: 2017 10:03:38 PM CST

Patient       Study

Name:   ANASTACIA RONDON       Date:   2017 9:35:38 PM CST

MRN:   [id number]       Modality Type:   CR

Gender:   F       Description:   CHEST

:   39       Institution:   [facility]

Physician:   NIGEL PEARSON

     Accession:    [id number]

 

 

Chest, AP portable 



History: Emphysema 



Findings: There are postoperative changes consistent with bilateral shoulder 
arthroplasty. Patient is status post anterior cervical fusion. Stimulator 
electrodes superimpose the midthoracic spine. There is no infiltrate, effusion 
or pneumothorax. Heart size and pulmonary vascularity are normal. There is 
calcification in the thoracic aorta. Since 2017, no significant 
change has occurred. 



Impression: No active disease and no interval change.

 

Electronically signed on 2017 10:03:38 PM CST by:

Ezra RUST

## 2017-11-19 VITALS
DIASTOLIC BLOOD PRESSURE: 73 MMHG | DIASTOLIC BLOOD PRESSURE: 73 MMHG | SYSTOLIC BLOOD PRESSURE: 156 MMHG | SYSTOLIC BLOOD PRESSURE: 156 MMHG | SYSTOLIC BLOOD PRESSURE: 156 MMHG | SYSTOLIC BLOOD PRESSURE: 156 MMHG | DIASTOLIC BLOOD PRESSURE: 73 MMHG | SYSTOLIC BLOOD PRESSURE: 156 MMHG | SYSTOLIC BLOOD PRESSURE: 156 MMHG | DIASTOLIC BLOOD PRESSURE: 73 MMHG | DIASTOLIC BLOOD PRESSURE: 73 MMHG | DIASTOLIC BLOOD PRESSURE: 73 MMHG

## 2017-11-19 LAB
EGFR (AFRICAN): > 60
EGFR (NON-AFRICAN): > 60
MCH RBC QN AUTO: 28.4 PG (ref 28–34)
MCV RBC AUTO: 95.4 FL (ref 80–100)

## 2017-11-19 RX ADMIN — BUDESONIDE SCH MG: 0.5 SUSPENSION RESPIRATORY (INHALATION) at 08:28

## 2017-11-19 RX ADMIN — RETINOL, ERGOCALCIFEROL, .ALPHA.-TOCOPHEROL ACETATE, DL-, PHYTONADIONE, ASCORBIC ACID, NIACINAMIDE, RIBOFLAVIN 5-PHOSPHATE SODIUM, THIAMINE HYDROCHLORIDE, PYRIDOXINE HYDROCHLORIDE, DEXPANTHENOL, BIOTIN, FOLIC ACID, AND CYANOCOBALAMIN SCH: KIT at 11:16

## 2017-11-19 RX ADMIN — RETINOL, ERGOCALCIFEROL, .ALPHA.-TOCOPHEROL ACETATE, DL-, PHYTONADIONE, ASCORBIC ACID, NIACINAMIDE, RIBOFLAVIN 5-PHOSPHATE SODIUM, THIAMINE HYDROCHLORIDE, PYRIDOXINE HYDROCHLORIDE, DEXPANTHENOL, BIOTIN, FOLIC ACID, AND CYANOCOBALAMIN SCH: KIT at 16:46

## 2017-11-19 RX ADMIN — MULTIVITAMIN TABLET SCH EACH: TABLET at 07:37

## 2017-11-19 RX ADMIN — CYANOCOBALAMIN TAB 1000 MCG SCH MCG: 1000 TAB at 07:37

## 2017-11-19 RX ADMIN — IPRATROPIUM BROMIDE AND ALBUTEROL SULFATE SCH ML: .5; 3 SOLUTION RESPIRATORY (INHALATION) at 05:36

## 2017-11-19 RX ADMIN — FOLIC ACID SCH MG: 1 TABLET ORAL at 07:37

## 2017-11-19 RX ADMIN — IPRATROPIUM BROMIDE AND ALBUTEROL SULFATE SCH ML: .5; 3 SOLUTION RESPIRATORY (INHALATION) at 00:33

## 2017-11-19 RX ADMIN — IPRATROPIUM BROMIDE AND ALBUTEROL SULFATE SCH ML: .5; 3 SOLUTION RESPIRATORY (INHALATION) at 12:54

## 2017-11-19 RX ADMIN — FLUDROCORTISONE ACETATE SCH MG: 0.1 TABLET ORAL at 07:36

## 2017-11-19 RX ADMIN — IPRATROPIUM BROMIDE AND ALBUTEROL SULFATE SCH ML: .5; 3 SOLUTION RESPIRATORY (INHALATION) at 08:22

## 2017-11-19 RX ADMIN — ENOXAPARIN SODIUM SCH MG: 30 INJECTION SUBCUTANEOUS at 00:37

## 2017-11-19 NOTE — INPATIENT PROGRESS NOTE
Subjective





- Required Recertification Statement


I anticipate X number of days because-include discharge plan: 0, discharge to 

home today





- Review of Systems


Events since last encounter: 


Pt c/o CP yesterday evening.  EKG was performed and troponins x 3 were 

measured.  No changes were found on EKG and troponins were all <0.3.  The chest 

pain resolved after breathing treatment was performed and has not recurred.  

This morning she has been up walking around well without assistance and feels 

ready to go home.


General: Denies: Chills, Fatigue


Pulmonary: Denies: Dyspnea, Pleuritic Chest Pain


Cardiovascular: Denies: Palpitations, Light Headedness


Gastrointestinal: Denies: Nausea, Vomiting


Musculoskeletal: Denies: Neck Pain, Arm Pain


Neurological: Denies: Weakness, Change in Speech, Confusion





Objective





- Exam


Vitals and I&O: 


 Vital Signs











Temp  97 F L  11/19/17 08:52


 


Pulse  81   11/19/17 08:52


 


Resp  16   11/19/17 08:52


 


BP  108/97   11/19/17 08:52


 


Pulse Ox  97   11/19/17 08:52











 Intake & Output











 11/18/17 11/18/17 11/19/17





 11:59 23:59 11:59


 


Intake Total 1160 1380 2140


 


Output Total  1250 


 


Balance 0915 312 5385


 


Weight 70.307 kg  159.8 kg


 


Intake:   


 


    100


 


    Left Antecubital 800  


 


    right forearm   100


 


  Oral 360 1380 2040


 


Output:   


 


  Urine  1050 


 


  Stool  200 


 


Other:   


 


  Voiding Method Toilet Toilet 


 


  # Voids 1 1 10


 


  # Bowel Movements 0  














General: Alert, Oriented to Person, Oriented to Place, Oriented to Time, 

Cooperative, No acute distress


HEENT: Atraumatic, PERRLA


Lungs: Clear to auscultation.  No: Respiratory Distress, Wheezes, Rhonchi


Cardiovascular: Regular rate, Normal S1, Normal S2


Abdomen: No: Distended, Rigid


Skin: Normal, Pink, Warm, Dry


Neurological: Normal speech, Normal tone.  No: Generalized Weakness


Psych/Mental Status: Mental status NL, Mood NL, Appropriate Affect





- Results


Results: 


 Laboratory Results











WBC  6.30 K/ul (4.00-12.00)   11/19/17  06:00    


 


RBC  3.34 M/ul (3.90-5.20)  L  11/19/17  06:00    


 


Hgb  9.5 g/dL (12.0-16.0)  L  11/19/17  06:00    


 


Hct  31.9 % (34.5-46.5)  L  11/19/17  06:00    


 


MCV  95.4 fl (80.0-100.0)   11/19/17  06:00    


 


MCH  28.4 pg (28.0-34.0)   11/19/17  06:00    


 


MCHC  29.8 g/dL (30.0-36.0)  L  11/19/17  06:00    


 


RDW  13.9 % (11.3-14.3)   11/19/17  06:00    


 


Plt Count  300 K/mm3 (130-400)   11/19/17  06:00    


 


Neut % (Auto)  51.4 % (39.0-79.0)   11/17/17  21:18    


 


Lymph % (Auto)  36.2 % (16.0-50.0)   11/17/17  21:18    


 


Mono % (Auto)  5.6 % (0.0-11.0)   11/17/17  21:18    


 


Eos % (Auto)  4.4 % (0.0-6.8)   11/17/17  21:18    


 


Baso % (Auto)  0.9  (0.0-1.5)   11/17/17  21:18    


 


Neut # (Auto)  3.6 # k/uL (1.4-7.7)   11/17/17  21:18    


 


Lymph # (Auto)  2.6 # k/uL (0.6-4.0)   11/17/17  21:18    


 


Mono # (Auto)  0.4 # k/uL (0.0-0.9)   11/17/17  21:18    


 


Eos # (Auto)  0.3 # k/uL (0.0-0.6)   11/17/17  21:18    


 


Baso # (Auto)  0.1 # k/uL (0.0-0.5)   11/17/17  21:18    


 


Reactive Lymphs %  1.5 % (0.0-5.0)   11/17/17  21:18    


 


Reactive Lymphs #  0.1 # k/uL (0.0-0.8)   11/17/17  21:18    


 


PT  9.3 Seconds (9.4-11.6)  L  11/17/17  20:18    


 


INR  0.89  (0.9-1.2)  L  11/17/17  20:18    


 


APTT  25.0 Seconds (24.5-32.8)   11/17/17  20:18    


 


Sodium  130 mmol/L (136-145)  L  11/19/17  06:00    


 


Potassium  3.9 mmol/L (3.5-5.1)   11/19/17  06:00    


 


Chloride  97 mmol/L ()  L  11/19/17  06:00    


 


Carbon Dioxide  27 mmol/L (22-30)   11/19/17  06:00    


 


BUN  24 mg/dL (7-17)  H  11/19/17  06:00    


 


Creatinine  1.00 mg/dL (0.52-1.04)   11/19/17  06:00    


 


Estimated Creat Clear  60   11/19/17  06:00    


 


Est GFR ( Amer)  > 60  (60-)  11/19/17  06:00    


 


Est GFR (Non-Af Amer)  > 60  (60-)  11/19/17  06:00    


 


Glucose  125 mg/dL ()  H  11/19/17  06:00    


 


Lactate  1.6 U/L (0.7-2.1)   11/17/17  21:18    


 


Calcium  8.5 mg/dL (8.4-10.2)   11/19/17  06:00    


 


Total Bilirubin  0.2 mg/dL (0.2-1.3)   11/19/17  06:00    


 


AST  17 U/L (15-46)   11/19/17  06:00    


 


ALT  32 U/L (13-69)   11/19/17  06:00    


 


Alkaline Phosphatase  78 U/L ()   11/19/17  06:00    


 


Troponin I  < 0.03 ng/mL (0.03-0.06)  L  11/19/17  05:15    


 


NT-Pro-B Natriuret Pep  605.0 pg/mL (15.0-450.0)  H  11/17/17  19:22    


 


Total Protein  5.9 g/dL (6.3-8.2)  L  11/19/17  06:00    


 


Albumin  3.1 g/dL (3.5-5.0)  L  11/19/17  06:00    


 


Lipase  402 U/L ()  H  11/17/17  21:53    


 


Urine Color  Yellow  (YELLOW)   11/17/17  20:15    


 


Urine Appearance  Clear  (CLEAR)   11/17/17  20:15    


 


Urine pH  5.5  (5.0 - 8.0)   11/17/17  20:15    


 


Ur Specific Gravity  1.010  (1.010-1.030)   11/17/17  20:15    


 


Urine Protein  Negative mg/dL (NEGATIVE)   11/17/17  20:15    


 


Urine Ketones  Negative mg/dL (NEGATIVE)   11/17/17  20:15    


 


Urine Occult Blood  Negative  (NEGATIVE)   11/17/17  20:15    


 


Urine Nitrite  Negative  (NEGATIVE)   11/17/17  20:15    


 


Urine Bilirubin  Negative  (NEGATIVE)   11/17/17  20:15    


 


Urine Urobilinogen  0.2 Eu (0.2-1.0)   11/17/17  20:15    


 


Ur Leukocyte Esterase  Negative  (NEGATIVE)   11/17/17  20:15    


 


Urine Glucose  Negative mg/dL (NEGATIVE)   11/17/17  20:15    

















Assessment/Plan





- Assessment/Plan


(1) Hyponatremia


Status: Acute   Current Visit: Yes   


Assessment: 


Stable after fluid restrictions were lifted yesterday and IV fluids were held 

after 8:30 this morning.  Pt clinically well.


Plan: 


Continue fludrocortisone at home








(2) Altered mental status


Status: Resolved   Current Visit: No   


Assessment: 


resolved


Plan: 


continue Fludrocortisone at home, AMS thought secondary to acute on chronic 

hyponatremia








(3) Anemia


Status: Chronic   Current Visit: No   


Qualifiers: 


   Anemia type: unspecified type   Qualified Code(s): D64.9 - Anemia, 

unspecified   


Assessment: 


Hgb dropped from 11.6 to 9.5.  H/o chronic mild anemia.  Pt clinically well.  

Will recheck later this week as OP.








(4) Diabetes mellitus type 2


Status: Chronic   Current Visit: No   


Assessment: 


Stable


Plan: 


Continue metformin








(5) Chronic obstructive pulmonary disease


Status: Chronic   Current Visit: No   


Assessment: 


Stable


Plan: 


Continue duoneb and pulmicort as prescribed at home.

## 2017-11-21 NOTE — DISCHARGE SUMMARY
Discharge Summary





- Discharge Sumary


History of Present Illness: 


Pat has a history of chronic hyponatremia, which has been known to 

affect her mental status in the past.  Her daughter found her on the floor in 

front of her chair, weak and confused.  Pat presented to [facility] ER in a 

state of somnolence, but hemodynamically stable.  Na was found to be 126.  She 

was admitted started on oral fluid restriction and fludrocortisone.


Additional Instructions: Discharge: to home.  Diet: Diabetic.  Meds: continue 

home meds and Fludricortisone 0.1mg daily.  Activity: as tolerated.  f/u: with 

Dr. Garcia within a week


Condition at Discharge: Stable


Home Medications: 


 Ambulatory Orders











 Medication  Instructions  Recorded


 


Multivitamin [Tab-A-Chandan] 1 each PO DAILY 01/24/15


 


Pramipexole Di-HCl [Mirapex] 0.25 mg PO BID 07/26/15


 


Ranitidine HCl [Zantac] 150 mg PO D 07/26/15


 


Folic Acid [FA-8] 0.8 mg PO D 02/22/16


 


Metformin HCl [Metformin HCl ER] 1,000 mg PO HS 12/26/16


 


Atorvastatin Calcium [Lipitor] 20 mg PO HS 08/23/17


 


Cyanocobalamin [Vitamin B-12] 1,000 mcg PO BID 08/23/17


 


Gabapentin [Neurontin] 300 mg PO HS 08/23/17


 


Fludrocortisone Acetate 0.1 mg PO QDAY 14 Days  tablet 11/19/17











Consultations this Visit: None


Procedures this Visit: None


Allergies/Adverse Reactions: 


 Allergies











Allergy/AdvReac Type Severity Reaction Status Date / Time


 


No Known Allergies Allergy   Verified 11/17/17 20:21











Hospital Course: After being on oral fluid restriction overnight, IV NaCl, and 

fludrocortisone overnight she felt back to normal with a Na of 130.  She was 

restarted on oral fluid and remained awake and alert with normal ambulation 

throughout the rest of her stay.  Na on day of discharge, after almost 24 hours 

back on oral fluids was again 130.





- Final Diagnosis


(1) Altered mental status


Problems: 


resolved








(2) Anemia


Problems: 


will check as OP, chronic anemia that decreased from 11.6 to 9.5 during her stay








(3) Diabetes mellitus type 2


Problems: 


stable








(4) Chronic obstructive pulmonary disease


Problems: 


stable








(5) Hyponatremia


Problems: 


improved during hospital stay, will continue fludrocortisone at home and f/u 

with nephrologist when possible

## 2017-11-27 ENCOUNTER — HOSPITAL ENCOUNTER (OUTPATIENT)
Dept: HOSPITAL 44 - LAB | Age: 82
End: 2017-11-27
Attending: FAMILY MEDICINE
Payer: MEDICARE

## 2017-11-27 DIAGNOSIS — E11.9: ICD-10-CM

## 2017-11-27 DIAGNOSIS — E87.1: ICD-10-CM

## 2017-11-27 DIAGNOSIS — I10: ICD-10-CM

## 2017-11-27 DIAGNOSIS — D50.9: Primary | ICD-10-CM

## 2017-11-27 LAB
BASOPHILS NFR BLD: 0.8 % (ref 0–1.5)
EGFR (AFRICAN): > 60
EGFR (NON-AFRICAN): > 60
EOSINOPHIL NFR BLD: 4.4 % (ref 0–6.8)
MCH RBC QN AUTO: 28.6 PG (ref 28–34)
MCV RBC AUTO: 93.7 FL (ref 80–100)
MONOCYTES %: 6.1 % (ref 0–11)
NEUTROPHILS #: 3.1 # K/UL (ref 1.4–7.7)

## 2017-11-27 PROCEDURE — 80053 COMPREHEN METABOLIC PANEL: CPT

## 2017-11-27 PROCEDURE — 36415 COLL VENOUS BLD VENIPUNCTURE: CPT

## 2017-11-27 PROCEDURE — 85025 COMPLETE CBC W/AUTO DIFF WBC: CPT

## 2017-12-11 ENCOUNTER — HOSPITAL ENCOUNTER (OUTPATIENT)
Dept: HOSPITAL 44 - LAB | Age: 82
End: 2017-12-11
Attending: FAMILY MEDICINE
Payer: MEDICARE

## 2017-12-11 DIAGNOSIS — E87.1: ICD-10-CM

## 2017-12-11 DIAGNOSIS — K85.00: ICD-10-CM

## 2017-12-11 DIAGNOSIS — D64.9: Primary | ICD-10-CM

## 2017-12-11 LAB
BASOPHILS NFR BLD: 1.1 % (ref 0–1.5)
EGFR (AFRICAN): > 60
EGFR (NON-AFRICAN): > 60
EOSINOPHIL NFR BLD: 5.2 % (ref 0–6.8)
MCH RBC QN AUTO: 29.2 PG (ref 28–34)
MCV RBC AUTO: 89.4 FL (ref 80–100)
MONOCYTES %: 5.1 % (ref 0–11)
NEUTROPHILS #: 3.6 # K/UL (ref 1.4–7.7)

## 2017-12-11 PROCEDURE — 83690 ASSAY OF LIPASE: CPT

## 2017-12-11 PROCEDURE — 36415 COLL VENOUS BLD VENIPUNCTURE: CPT

## 2017-12-11 PROCEDURE — 85025 COMPLETE CBC W/AUTO DIFF WBC: CPT

## 2017-12-11 PROCEDURE — 80048 BASIC METABOLIC PNL TOTAL CA: CPT

## 2018-01-08 ENCOUNTER — HOSPITAL ENCOUNTER (OUTPATIENT)
Dept: HOSPITAL 44 - LAB | Age: 83
End: 2018-01-08
Attending: FAMILY MEDICINE
Payer: MEDICARE

## 2018-01-08 DIAGNOSIS — D64.9: ICD-10-CM

## 2018-01-08 DIAGNOSIS — E87.1: Primary | ICD-10-CM

## 2018-01-08 LAB
BASOPHILS NFR BLD: 1.2 % (ref 0–1.5)
EGFR (AFRICAN): > 60
EGFR (NON-AFRICAN): 42
EOSINOPHIL NFR BLD: 5.5 % (ref 0–6.8)
MCH RBC QN AUTO: 29.1 PG (ref 28–34)
MCV RBC AUTO: 90.7 FL (ref 80–100)
MONOCYTES %: 7.2 % (ref 0–11)
NEUTROPHILS #: 3.1 # K/UL (ref 1.4–7.7)

## 2018-01-08 PROCEDURE — 85025 COMPLETE CBC W/AUTO DIFF WBC: CPT

## 2018-01-08 PROCEDURE — 80048 BASIC METABOLIC PNL TOTAL CA: CPT

## 2018-01-08 PROCEDURE — 36415 COLL VENOUS BLD VENIPUNCTURE: CPT

## 2018-01-16 ENCOUNTER — HOSPITAL ENCOUNTER (OUTPATIENT)
Dept: HOSPITAL 44 - POD | Age: 83
End: 2018-01-16
Attending: PODIATRIST
Payer: MEDICARE

## 2018-01-16 DIAGNOSIS — M20.41: ICD-10-CM

## 2018-01-16 DIAGNOSIS — M20.42: ICD-10-CM

## 2018-01-16 DIAGNOSIS — E11.42: Primary | ICD-10-CM

## 2018-01-16 DIAGNOSIS — L84: ICD-10-CM

## 2018-01-17 ENCOUNTER — HOSPITAL ENCOUNTER (OUTPATIENT)
Dept: HOSPITAL 44 - LAB | Age: 83
End: 2018-01-17
Attending: FAMILY MEDICINE
Payer: MEDICARE

## 2018-01-17 DIAGNOSIS — E11.9: ICD-10-CM

## 2018-01-17 DIAGNOSIS — K85.91: Primary | ICD-10-CM

## 2018-01-17 DIAGNOSIS — E79.0: ICD-10-CM

## 2018-01-17 PROCEDURE — 83036 HEMOGLOBIN GLYCOSYLATED A1C: CPT

## 2018-01-17 PROCEDURE — 82043 UR ALBUMIN QUANTITATIVE: CPT

## 2018-01-17 PROCEDURE — 83690 ASSAY OF LIPASE: CPT

## 2018-01-17 PROCEDURE — 84550 ASSAY OF BLOOD/URIC ACID: CPT

## 2018-01-17 PROCEDURE — 36415 COLL VENOUS BLD VENIPUNCTURE: CPT

## 2018-01-19 ENCOUNTER — HOSPITAL ENCOUNTER (EMERGENCY)
Dept: HOSPITAL 44 - ED | Age: 83
Discharge: HOME | End: 2018-01-19
Payer: MEDICARE

## 2018-01-19 VITALS — DIASTOLIC BLOOD PRESSURE: 74 MMHG | SYSTOLIC BLOOD PRESSURE: 145 MMHG
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
## 2018-01-19 DIAGNOSIS — S00.93XA: Primary | ICD-10-CM

## 2018-01-19 DIAGNOSIS — S10.93XA: ICD-10-CM

## 2018-01-19 LAB
BASOPHILS NFR BLD: 0.6 % (ref 0–1.5)
EGFR (AFRICAN): > 60
EGFR (NON-AFRICAN): > 60
EOSINOPHIL NFR BLD: 5.4 % (ref 0–6.8)
MCH RBC QN AUTO: 29.2 PG (ref 28–34)
MCV RBC AUTO: 91.2 FL (ref 80–100)
MONOCYTES %: 5.6 % (ref 0–11)
NEUTROPHILS #: 4.6 # K/UL (ref 1.4–7.7)

## 2018-01-19 PROCEDURE — 70450 CT HEAD/BRAIN W/O DYE: CPT

## 2018-01-19 PROCEDURE — 72125 CT NECK SPINE W/O DYE: CPT

## 2018-01-19 PROCEDURE — 85025 COMPLETE CBC W/AUTO DIFF WBC: CPT

## 2018-01-19 PROCEDURE — 99283 EMERGENCY DEPT VISIT LOW MDM: CPT

## 2018-01-19 PROCEDURE — 85610 PROTHROMBIN TIME: CPT

## 2018-01-19 PROCEDURE — 80053 COMPREHEN METABOLIC PANEL: CPT

## 2018-01-19 NOTE — DIAGNOSTIC IMAGING REPORT
MICHAEL LINDA 

[facility]

[address] [facility] [address] [place] [address]. [address]

 

 

 

 

Report Submission Date: 2018 3:02:12 AM CST

Patient       Study

Name:   ANASTACIA RONDON       Date:   2018 2:49:47 AM CST

MRN:   [id number]       Modality Type:   CT\SR

Gender:   F       Description:   CT C-SPINE W/O CONTRAS

:   39       Institution:   [facility]

Physician:   MICHAEL LINDA

     Accession:    [id number]

 

 

CT of the cervical spine 

Clinical history: NECK PAIN (DICOM Hx) 

Technique: CT of the cervical spine was performed in contiguous axial slices 
with sagittal and coronal reconstructions. 

Findings: The alignment of the vertebrae is anatomic. There are postoperative 
changes status post fusion of C5 through C7 with anterior plate secured by 
screws into the vertebrae. Bone plugs overlie the intervening disc spaces. The 
C1/C2 articulation is normal and the base of the odontoid is intact. Posterior 
osteophytes and degenerative facet changes narrow the neural foramina C3/C4 on 
the left and C5/C6 on the right. There is no evident fracture. 

Impression: 

1. Spondylosis. 

2. Postoperative fusion C5 through C7. 

3. No fracture.

 

Electronically signed on 2018 3:02:12 AM CST by:

Antony RUST

## 2018-01-19 NOTE — DIAGNOSTIC IMAGING REPORT
MICHAEL LINDA 

[facility]

[address] [facility] [address]

[place], [place]. [address]

 

 

 

 

Report Submission Date: 2018 3:09:07 AM CST

Patient       Study

Name:   ANASTACIA RONDON       Date:   2018 2:47:19 AM CST

MRN:   [id number]       Modality Type:   CT\SR

Gender:   F       Description:   CT BRAIN W/O CONTRAST

:   39       Institution:   [facility]

Physician:   MICHAEL LINDA

     Accession:    [id number]

 

 

Head CT without contrast 

Clinical history: FELL, HIT HEAD, NO LOC (DICOM Hx) 

Technique: CT examination of the brain was performed in contiguous axial slices 
without the use of contrast. 

Findings: The 4th ventricle lies in a normal midline position. The ventricles 
and sulci are prominent secondary to atrophy. Chronic ischemic changes are 
present in the periventricular regions. Intracranial atherosclerosis is 
demonstrated. There is no hypodense or hyperdense mass or intracranial 
hemorrhage. Fluid is present in the maxillary sinuses bilaterally consistent 
with maxillary sinusitis. 

Impression: 

1. Atrophy and chronic small vessel ischemic changes. 

2. Maxillary sinusitis.

 

Electronically signed on 2018 3:09:07 AM CST by:

Antony RUST

## 2018-01-19 NOTE — ED PHYSICIAN DOCUMENTATION
Fall





- HISTORIAN


Historian: patient, child





- HPI


Stated Complaint: Fall at home


Chief Complaint: Fall


Additional Information: 





pt in br over balanced fell hit occiput no loc now drowsi vs sleepy-answers 

appropriate but mu get her awake,.  she c.o head neck kpain  granddaughter 

lives w. her.  nurses report she was coherent on arrival approx 30 min ago


Onset: just prior to arrival, hours (fell approx 0100)


Where: home


Context: lost balance


r: moderate


Associated Symptoms:: no loss of consciousness


Location of Pain/Injury: head, neck


Injury to Right Extremity: none


Injury to Left Extremity: none





- ROS


CONST: no problems, other (all chronic accd to g-daughter).  denies: recent 

illness


NEURO: dizziness


MS/SKIN/LYMPH: weakness


EYES/ENT: none


CVS/RESP: none


GI/: denies: nausea, vomiting





- PAST HX


Past History: cardiac disease, diabetes Type 1, COPD, other (htn renal failure 

chf ihd hyponatremia ch pancreatitis)


Allergies/Adverse Reactions: 


 Allergies











Allergy/AdvReac Type Severity Reaction Status Date / Time


 


No Known Allergies Allergy   Verified 01/19/18 01:50














Home Medications: 


 Ambulatory Orders











 Medication  Instructions  Recorded


 


Multivitamin [Tab-A-Chandan] 1 each PO DAILY 01/24/15


 


Pramipexole Di-HCl [Mirapex] 0.25 mg PO BID 07/26/15


 


Ranitidine HCl [Zantac] 150 mg PO D 07/26/15


 


Folic Acid [FA-8] 0.8 mg PO D 02/22/16


 


Metformin HCl [Metformin HCl ER] 1,000 mg PO HS 12/26/16


 


Atorvastatin Calcium [Lipitor] 20 mg PO HS 08/23/17


 


Cyanocobalamin [Vitamin B-12] 1,000 mcg PO BID 08/23/17


 


Gabapentin [Neurontin] 300 mg PO HS 08/23/17














- SOCIAL HX


Smoking History: non-smoker, quit greater than 1 year


Alcohol Use: none


Drug Use: none





- FAMILY HX


Family History: no significant history





- VITAL SIGNS


Vital Signs: 





 Vital Signs











Temp Pulse Resp BP Pulse Ox


 


 98.7 F   76   20   135/50   88 L


 


 01/19/18 01:35  01/19/18 01:35  01/19/18 01:35  01/19/18 01:35  01/19/18 01:35














- REVIEWED ASSESSMENTS


Nursing Assessment  Reviewed: Yes


Vitals Reviewed: Yes





ED Results Lab/Radiology





- Orders


Orders: 





 ED Orders











 Category Date Time Status


 


 CT BRAIN W/O CONTRAST Stat Exams  01/19/18 Ordered


 


 CT C-SPINE W/O CONTRAST Stat Exams  01/19/18 Ordered


 


 CBC/PLATELET/DIFF Routine Lab  01/19/18 Ordered


 


 CMP Routine Lab  01/19/18 Ordered


 


 PT-INR Routine Lab  01/19/18 Ordered














Fall Physical Exam





- Physical Exam


General Appearance: moderate distress (very hard to awaken - unsure if pt 

altered or just asleep)


Head: no swelling.  No: non-tender, no obvious injury, raccoon eyes


Neck: No: non-tender, painless ROM


Eye: DUSTIN, EOMI


ENT: nml external inspection, no dental injury


Resp/CVS: chest non-tender, no ecchymosis, breath sounds nml, heart sounds nml


Abdomen: soft, non-tender


Neuro: oriented x3, sensation nml, motor nml, depressed mood/affect.  No: mood/

affect nml


Skin: color nml, no rash.  No: cyanosis, diaphoresis, pallor


Extremities: atraumatic, pelvis stable, hips non-tender


Joint: joints nml, nml ROM, Nml gait/weight bearing.  No: ligamentous 

instability





- Lorenzo Coma Score


Eyes Open: To Voice (then difficult  -  altered vs just asleep)


Motor: Localizes to Pain





Discharge


Clincal Impression: 


 fall w/ contusion head neck





Referrals: 


Johnnie Garcia MD [Primary Care Provider] - 2 Days


Condition: Good


Disposition: 01 HOME, SELF-CARE


Decision to Admit: NO


Decision Time: 03:31

## 2018-01-24 ENCOUNTER — HOSPITAL ENCOUNTER (EMERGENCY)
Dept: HOSPITAL 44 - ED | Age: 83
Discharge: HOME | End: 2018-01-24
Payer: MEDICARE

## 2018-01-24 VITALS — SYSTOLIC BLOOD PRESSURE: 142 MMHG | DIASTOLIC BLOOD PRESSURE: 55 MMHG

## 2018-01-24 DIAGNOSIS — T14.90XA: Primary | ICD-10-CM
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
## 2018-01-24 LAB
BASOPHILS NFR BLD: 1.1 % (ref 0–1.5)
EGFR (AFRICAN): > 60
EGFR (NON-AFRICAN): > 60
EOSINOPHIL NFR BLD: 4.3 % (ref 0–6.8)
MCH RBC QN AUTO: 29.1 PG (ref 28–34)
MCV RBC AUTO: 89.7 FL (ref 80–100)
MONOCYTES %: 5.5 % (ref 0–11)
NEUTROPHILS #: 4 # K/UL (ref 1.4–7.7)

## 2018-01-24 PROCEDURE — S1016 NON-PVC INTRAVENOUS ADMINIST: HCPCS

## 2018-01-24 PROCEDURE — 70450 CT HEAD/BRAIN W/O DYE: CPT

## 2018-01-24 PROCEDURE — 80053 COMPREHEN METABOLIC PANEL: CPT

## 2018-01-24 PROCEDURE — 99283 EMERGENCY DEPT VISIT LOW MDM: CPT

## 2018-01-24 PROCEDURE — 85025 COMPLETE CBC W/AUTO DIFF WBC: CPT

## 2018-01-24 PROCEDURE — 72110 X-RAY EXAM L-2 SPINE 4/>VWS: CPT

## 2018-01-24 PROCEDURE — 36415 COLL VENOUS BLD VENIPUNCTURE: CPT

## 2018-01-24 NOTE — ED PHYSICIAN DOCUMENTATION
Fall





- HISTORIAN


Historian: patient





- HPI


Stated Complaint: fall


Chief Complaint: Fall


Onset: just prior to arrival


Where: home


Context: other (She is not sure how it happened she reports )


r: mild


Associated Symptoms:: no loss of consciousness


Location of Pain/Injury: head, lower back


Injury to Right Extremity: none


Injury to Left Extremity: none


Further Comments: yes (She states she went to the bathroom and she just fell. 

She denies any loss of conciousness. She now complains of low back pain and 

head pain)





- ROS


CONST: no problems


NEURO: denies: dizziness





- PAST HX


Past History: other


Immunizations: referred to PCP





- SOCIAL HX


Smoking History: non-smoker


Alcohol Use: none


Drug Use: none





- FAMILY HX


Family History: none





- VITAL SIGNS


Vital Signs: 





 Vital Signs











Temp Pulse Resp BP Pulse Ox


 


          145/74    


 


          01/19/18 03:35   














- REVIEWED ASSESSMENTS


Nursing Assessment  Reviewed: Yes


Vitals Reviewed: Yes





<Yenni Wheatley - Last Filed: 01/24/18 07:04>





- VITAL SIGNS


Vital Signs: 





 Vital Signs











Temp Pulse Resp BP Pulse Ox


 


 97.1 F L  69   18   162/56   90 L


 


 01/24/18 05:45  01/24/18 05:45  01/24/18 05:45  01/24/18 05:45  01/24/18 05:45














<Philip Kennedy - Last Filed: 01/24/18 08:11>





- PAST HX


Allergies/Adverse Reactions: 


 Allergies











Allergy/AdvReac Type Severity Reaction Status Date / Time


 


No Known Allergies Allergy   Verified 01/19/18 01:50














Home Medications: 


 Ambulatory Orders











 Medication  Instructions  Recorded


 


Multivitamin [Tab-A-Chandan] 1 each PO DAILY 01/24/15


 


Pramipexole Di-HCl [Mirapex] 0.25 mg PO BID 07/26/15


 


Ranitidine HCl [Zantac] 150 mg PO D 07/26/15


 


Folic Acid [FA-8] 0.8 mg PO D 02/22/16


 


Metformin HCl [Metformin HCl ER] 1,000 mg PO HS 12/26/16


 


Atorvastatin Calcium [Lipitor] 20 mg PO HS 08/23/17


 


Cyanocobalamin [Vitamin B-12] 1,000 mcg PO BID 08/23/17


 


Gabapentin [Neurontin] 300 mg PO TID 08/23/17














Progress





- Progress


Progress: 





0655: Pt is resting quietly in bed. Daughter at bedside. Daughter is concerned 

about new mental status changes. They did see Neurology yesterday and he 

believes this is due to her CPAP.  Daughter is frustrated with how to care for 

pt. DG 





<Yenni Wheatley - Last Filed: 01/24/18 07:04>





ED Results Lab/Radiology





- Radiology


Radiology Impressions: 


CT head 


History: PT FELL THIS MORNING WALKING TO RESTROOM; PT IS CURRENTLY UNCONSCIOUS; 

NO HX OF FX OR SURGERY 


Multiple axial images of the brain are submitted with reconstructions 


Comparison: January 19, 2018 





No evidence of acute intracranial hemorrhage. No midline shift. 


Cerebral atrophy and periventricular small vessel ischemic disease. Band of 

increased density across the lower bruno and cerebellum causes artifact. 


Again noted is partial opacification of both maxillary sinuses. No skull 

fracture 





Impression: 


1. No evidence of acute intracranial hemorrhage. No midline shift 


2. Cerebral atrophy and periventricular small vessel ischemic disease. 

Maxillary sinusitis. 


Electronically signed on Jan 24, 2018 6:48:32 AM CST by: 


Sandra Henley








5 views of the lumbar spine 


History: PT FELL THIS MORNING WALKING TO THE RESTROOM; HX OF SPINAL SURGERY AND 

SPINAL STIMULATOR PLACEMENT; PT IS CURRENTLY UNCONSCIOUS; OTHER HX UNKNOWN 


No comparison studies are available at the time of dictation, study dated 

November 30, 2016 not available 





Spinal stimulator is noted in the left midabdomen. Anterior lumbar fusion is 

noted from L4 to S1. L5 significantly demineralized. There is grade I-II 

anterolisthesis of L4 - L5. Posterior facet arthropathy is noted in the lower 

lumbar spine. Multilevel extensive degenerative changes are present. Posterior 

screws are noted at L5- S1. Patient is post left hip arthroplasty. Intact 

hardware. Degenerative changes are noted throughout the lumbar spine. Mild 

widening of the pubic symphysis 





Impression: 


1. Post lumbar fusion L4 to S1. Grade I-II anterolisthesis of L4- L5. Extensive 

multilevel degenerative changes. No obvious acute fracture fragment is 

identified. 


2. Mild widening of the pubic symphysis. Consider followup as needed. 


Electronically signed on Jan 24, 2018 7:03:24 AM CST by: 


Sandra Henley





<Yenni Wheatley - Last Filed: 01/24/18 07:04>





- Lab Results


Lab Results: 





 Lab Results











  01/24/18 01/24/18





  07:30 07:30


 


WBC    6.40 K/ul K/ul





    (4.00-12.00) 


 


RBC    4.12 M/ul M/ul





    (3.90-5.20) 


 


Hgb    12.0 g/dL g/dL





    (12.0-16.0) 


 


Hct    36.9 % %





    (34.5-46.5) 


 


MCV    89.7 fl fl





    (80.0-100.0) 


 


MCH    29.1 pg pg





    (28.0-34.0) 


 


MCHC    32.4 g/dL g/dL





    (30.0-36.0) 


 


RDW    15.0 % H %





    (11.3-14.3) 


 


Plt Count    367 K/mm3 K/mm3





    (130-400) 


 


Neut % (Auto)    63.7 % %





    (39.0-79.0) 


 


Lymph % (Auto)    23.2 % %





    (16.0-50.0) 


 


Mono % (Auto)    5.5 % %





    (0.0-11.0) 


 


Eos % (Auto)    4.3 % %





    (0.0-6.8) 


 


Baso % (Auto)    1.1 





    (0.0-1.5) 


 


Neut # (Auto)    4.0 # k/uL # k/uL





    (1.4-7.7) 


 


Lymph # (Auto)    1.5 # k/uL # k/uL





    (0.6-4.0) 


 


Mono # (Auto)    0.4 # k/uL # k/uL





    (0.0-0.9) 


 


Eos # (Auto)    0.3 # k/uL # k/uL





    (0.0-0.6) 


 


Baso # (Auto)    0.1 # k/uL # k/uL





    (0.0-0.5) 


 


Reactive Lymphs %    2.2 % %





    (0.0-5.0) 


 


Reactive Lymphs #    0.1 # k/uL # k/uL





    (0.0-0.8) 


 


Sodium  142 mmol/L mmol/L  





   (136-145)  


 


Potassium  3.6 mmol/L mmol/L  





   (3.5-5.1)  


 


Chloride  99 mmol/L mmol/L  





   ()  


 


Carbon Dioxide  29 mmol/L mmol/L  





   (22-30)  


 


BUN  18 mg/dL H mg/dL  





   (7-17)  


 


Creatinine  1.10 mg/dL H mg/dL  





   (0.52-1.04)  


 


Estimated Creat Clear  51   





   


 


Est GFR ( Amer)  > 60   





  (60 - ) 


 


Est GFR (Non-Af Amer)  > 60   





  (60 - ) 


 


Glucose  162 mg/dL H mg/dL  





   ()  


 


Calcium  9.1 mg/dL mg/dL  





   (8.4-10.2)  


 


Total Bilirubin  0.4 mg/dL mg/dL  





   (0.2-1.3)  


 


AST  21 U/L U/L  





   (15-46)  


 


ALT  29 U/L U/L  





   (13-69)  


 


Alkaline Phosphatase  93 U/L U/L  





   ()  


 


Total Protein  6.8 g/dL g/dL  





   (6.3-8.2)  


 


Albumin  3.9 g/dL g/dL  





   (3.5-5.0)  














- Orders


Orders: 





 ED Orders











 Category Date Time Status


 


 Place IV Lock 1T Care  01/24/18 06:54 Active


 


 CT BRAIN W/O CONTRAST Stat Exams  01/24/18 Completed


 


 LUMBAR SPINE WITH OBLIQUES [L SPINE 4 VIEWS] [RAD] Stat Exams  01/24/18 

Completed


 


 CBC/PLATELET/DIFF Stat Lab  01/24/18 07:30 Completed


 


 CMP Stat Lab  01/24/18 07:30 Completed














<Philip Kennedy - Last Filed: 01/24/18 08:11>





Fall Physical Exam





- Physical Exam


General Appearance: no acute distress, alert


Head: non-tender (raised area on left scalp )


Neck: non-tender


Eye: DUSTIN


Resp/CVS: chest non-tender, breath sounds nml, no resp. distress, heart sounds 

nml


Abdomen: soft


Neuro: oriented x3, CN's nml as tested, sensation nml, motor nml, mood/affect 

nml,  nml, reflexes nml,  symmetrical


Skin: color nml, no rash


Back: normal inspection, other (pain with palpation mid lumbar region )


Extremities: atraumatic


Joint: joints nml, nml ROM, Nml gait/weight bearing





- Renny Coma Score


Eyes Open: Spontaneous


Speech: Oriented


Motor: Obeys Commands





<Yenni Wheatley - Last Filed: 01/24/18 07:04>





Discharge





<Yenni Wheatley - Last Filed: 01/24/18 07:04>


Decision to Admit: NO


Decision Time: 08:09





<Philip Kennedy - Last Filed: 01/24/18 08:11>


Clincal Impression: 


 fall





Referrals: 


Johnnie Garcia MD [Primary Care Provider] - 


Condition: Stable


Disposition: 01 HOME, SELF-CARE

## 2018-01-24 NOTE — DIAGNOSTIC IMAGING REPORT
TERRY SORTO~

 [facility]

 [address] [facility] [address]

 [place], [place]. [address]

~

Report Submission Date: 2018 7:03:24 AM CST







Patient ~ Study  

 

Name: ANASTACIA RONDON ~ Date: 2018 6:25:42 AM CST

 

MRN: [id number] ~ Modality Type: CR

 

Gender: F ~ Description: SPINE

 

: 39 ~ Institution: [facility]

 

Physician: TERRY SORTO

  ~ ~Accession: ~[id number]





~

5 views of the lumbar spine 

History: PT FELL THIS MORNING WALKING TO THE RESTROOM; HX OF SPINAL SURGERY AND 
SPINAL STIMULATOR PLACEMENT; PT IS CURRENTLY UNCONSCIOUS; OTHER HX UNKNOWN 

No comparison studies are available at the time of dictation, study dated 
2016 not available 



Spinal stimulator is noted in the left midabdomen. Anterior lumbar fusion is 
noted from L4 to S1. L5 significantly demineralized. There is grade I-II 
anterolisthesis of L4 - L5. Posterior facet arthropathy is noted in the lower 
lumbar spine. Multilevel extensive degenerative changes are present. Posterior 
screws are noted at L5- S1. Patient is post left hip arthroplasty. Intact 
hardware. Degenerative changes are noted throughout the lumbar spine. Mild 
widening of the pubic symphysis 



Impression: 

1. Post lumbar fusion L4 to S1. Grade I-II anterolisthesis of L4- L5. Extensive 
multilevel degenerative changes. No obvious acute fracture fragment is 
identified. 

2. Mild widening of the pubic symphysis. Consider followup as needed.

~

Electronically signed on 2018 7:03:24 AM CST by:

Sandra RUST

## 2018-01-24 NOTE — DIAGNOSTIC IMAGING REPORT
TERRY SORTO~

 [facility]

 [address] [facility] [address]

 [place], [place]. [address]

~

Report Submission Date: 2018 6:48:32 AM CST







Patient ~ Study  

 

Name: ANASTACIA RONDON ~ Date: 2018 6:05:07 AM CST

 

MRN: [id number] ~ Modality Type: CT\SR

 

Gender: F ~ Description: CT BRAIN W/O CONTRAST

 

: 39 ~ Institution: [facility]

 

Physician: TERRY SORTO

  ~ ~Accession: ~[id number]





~

CT head 

History: PT FELL THIS MORNING WALKING TO RESTROOM; PT IS CURRENTLY UNCONSCIOUS; 
NO HX OF FX OR SURGERY 

Multiple axial images of the brain are submitted with reconstructions 

Comparison: 2018 



No evidence of acute intracranial hemorrhage. No midline shift. 

Cerebral atrophy and periventricular small vessel ischemic disease. Band of 
increased density across the lower bruno and cerebellum causes artifact. 

Again noted is partial opacification of both maxillary sinuses. ~No skull 
fracture 



Impression: 

1. No evidence of acute intracranial hemorrhage. No midline shift 

2. Cerebral atrophy and periventricular small vessel ischemic disease. 
Maxillary sinusitis.

~

Electronically signed on 2018 6:48:32 AM CST by:

Sandra RUST

## 2018-01-26 ENCOUNTER — HOSPITAL ENCOUNTER (EMERGENCY)
Dept: HOSPITAL 44 - ED | Age: 83
LOS: 1 days | Discharge: HOME | End: 2018-01-27
Payer: MEDICARE

## 2018-01-26 DIAGNOSIS — S09.90XA: Primary | ICD-10-CM
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
## 2018-01-26 PROCEDURE — 70450 CT HEAD/BRAIN W/O DYE: CPT

## 2018-01-26 PROCEDURE — 81002 URINALYSIS NONAUTO W/O SCOPE: CPT

## 2018-01-26 PROCEDURE — 99283 EMERGENCY DEPT VISIT LOW MDM: CPT

## 2018-01-27 VITALS — SYSTOLIC BLOOD PRESSURE: 150 MMHG | DIASTOLIC BLOOD PRESSURE: 92 MMHG

## 2018-01-27 LAB
APPEARANCE UR: CLEAR
COLOR,URINE: YELLOW
PH UR STRIP: 7 [PH] (ref 5–8)
RBC UR QL: NEGATIVE
UROBILINOGEN URINE: 0.2 EU (ref 0.2–1)

## 2018-01-27 NOTE — ED PHYSICIAN DOCUMENTATION
Fall





- HISTORIAN


Historian: patient, other (family)





- HPI


Stated Complaint: fall, head injury


Chief Complaint: Fall


Associated Symptoms:: no loss of consciousness


Location of Pain/Injury: head


Injury to Right Extremity: none


Injury to Left Extremity: none


Further Comments: yes (82 year old female patient present after a fall from 

standing.  Struck head on floor, witnessed by grandson, no LOC.)





- ROS


CONST: other (frequent falls - saw Dr Garcia last week for work up )


NEURO: denies: dizziness


MS/SKIN/LYMPH: back pain (chronic).  denies: neck pain, ankle swelling, leg 

swelling


EYES/ENT: none


CVS/RESP: none


GI/: denies: problems urinating, nausea, vomiting





- PAST HX


Past History: cardiac disease, AMI, diabetes Type 2


Allergies/Adverse Reactions: 


 Allergies











Allergy/AdvReac Type Severity Reaction Status Date / Time


 


No Known Allergies Allergy   Verified 01/27/18 00:11














Home Medications: 


 Ambulatory Orders











 Medication  Instructions  Recorded


 


Multivitamin [Tab-A-Chandan] 1 each PO DAILY 01/24/15


 


Pramipexole Di-HCl [Mirapex] 0.25 mg PO BID 07/26/15


 


Ranitidine HCl [Zantac] 150 mg PO D 07/26/15


 


Folic Acid [FA-8] 0.8 mg PO D 02/22/16


 


Metformin HCl [Metformin HCl ER] 1,000 mg PO HS 12/26/16


 


Atorvastatin Calcium [Lipitor] 20 mg PO HS 08/23/17


 


Cyanocobalamin [Vitamin B-12] 1,000 mcg PO BID 08/23/17


 


Gabapentin [Neurontin] 300 mg PO TID 08/23/17














- SOCIAL HX


Smoking History: non-smoker





- FAMILY HX


Family History: denies: none





- VITAL SIGNS


Vital Signs: 





 Vital Signs











Temp Pulse Resp BP Pulse Ox


 


    69   18   150/54   96 


 


    01/26/18 23:50  01/26/18 23:50  01/26/18 23:50  01/26/18 23:50














- REVIEWED ASSESSMENTS


Nursing Assessment  Reviewed: Yes


Vitals Reviewed: Yes





Progress





- Progress


Progress: 





2 cm area of edema noted over occipital area. 





ED Results Lab/Radiology





- Radiology


Radiology Impressions: 





 


Head CT without contrast


Clinical history: FALL


Technique: CT examination of the brain was performed in contiguous axial slices 

without the use of contrast. Sagittal and coronal reconstructions are performed 

by the technologist. Comparison is made to a prior study dated January 24, 2018.


Findings: The 4th ventricle lies in a normal midline position. The ventricles 

and sulci are prominent secondary to atrophy. There has been no interval 

development of hypodense or hyperdense mass or midline shift. Intracranial 

atherosclerosis is demonstrated. Mucosal thickening present in the maxillary 

sinuses. Mastoid air cells are clear.


Impression:


1. Atrophy and chronic small vessel ischemic changes.


2. Intracranial atherosclerosis.


3. Chronic paranasal sinus changes.


4. No significant change.


 


Electronically signed on Jan 27, 2018 12:37:50 AM CST by:


Antony Espinosa





- Orders


Orders: 





 ED Orders











 Category Date Time Status


 


 CT BRAIN W/O CONTRAST Stat Exams  01/27/18 Completed














Fall Physical Exam





- Physical Exam


General Appearance: mild distress


Neck: non-tender, painless ROM, trachea midline


Eye: DUSTIN


Resp/CVS: chest non-tender, no ecchymosis, breath sounds nml, no resp. distress

, heart sounds nml


Abdomen: soft, no organomegaly, normal bowel sounds, no abdominal bruit, no 

distension


Neuro: oriented x3, CN's nml as tested, sensation nml, motor nml, mood/affect 

nml,  nml, reflexes nml,  symmetrical


Skin: color nml, no rash, nml palp., dry


Back: normal inspection, no CVA tenderness


Extremities: atraumatic, pelvis stable, hips non-tender, no pedal edema, nml ROM

, nml color/temp


Joint: joints nml, nml ROM, Nml gait/weight bearing





- Renny Coma Score


Eyes Open: Spontaneous


Speech: Oriented


Motor: Obeys Commands





Discharge


Clincal Impression: 


Fall


Qualifiers:


 Encounter type: initial encounter Qualified Code(s): W19.XXXA - Unspecified 

fall, initial encounter





Additional Instructions: 


Use your cane and walker at all times for through the weekend. 





Follow up with Dr Garcia next week for reevaluation. 


Condition: Stable


Disposition: 01 HOME, SELF-CARE


Decision to Admit: NO


Decision Time: 00:58

## 2018-01-27 NOTE — DIAGNOSTIC IMAGING REPORT
CYNTHIA STEINBERG (ZHANNA) - [facility] 



[address] [facility] [address] [place] [address]. [address]

 

 

 

 

Report Submission Date: 2018 12:37:50 AM CST

Patient       Study

Name:   ANASTACIA RONDON       Date:   2018 12:18:04 AM CST

MRN:   [id number]       Modality Type:   CT\SR

Gender:   F       Description:   CT BRAIN W/O CONTRAST

:   39       Institution:   

Physician:   CYNTHIA STEINBERG) - [facility]

     Accession:    [id number]

 

 

Head CT without contrast 

Clinical history: FALL 

Technique: CT examination of the brain was performed in contiguous axial slices 
without the use of contrast. Sagittal and coronal reconstructions are performed 
by the technologist. Comparison is made to a prior study dated 2018. 

Findings: The 4th ventricle lies in a normal midline position. The ventricles 
and sulci are prominent secondary to atrophy. There has been no interval 
development of hypodense or hyperdense mass or midline shift. Intracranial 
atherosclerosis is demonstrated. Mucosal thickening present in the maxillary 
sinuses. Mastoid air cells are clear. 

Impression: 

1. Atrophy and chronic small vessel ischemic changes. 

2. Intracranial atherosclerosis. 

3. Chronic paranasal sinus changes. 

4. No significant change.

 

Electronically signed on 2018 12:37:50 AM CST by:

Antony RUST

## 2018-02-24 ENCOUNTER — HOSPITAL ENCOUNTER (EMERGENCY)
Dept: HOSPITAL 44 - ED | Age: 83
Discharge: HOME | End: 2018-02-24
Payer: MEDICARE

## 2018-02-24 VITALS — DIASTOLIC BLOOD PRESSURE: 48 MMHG | SYSTOLIC BLOOD PRESSURE: 128 MMHG
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
## 2018-02-24 DIAGNOSIS — S40.012A: Primary | ICD-10-CM
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
## 2018-02-24 LAB
APPEARANCE UR: CLEAR
BASOPHILS NFR BLD: 0.8 % (ref 0–1.5)
COLOR,URINE: YELLOW
EGFR (AFRICAN): > 60
EGFR (NON-AFRICAN): > 60
EOSINOPHIL NFR BLD: 6.4 % (ref 0–6.8)
MCH RBC QN AUTO: 28.8 PG (ref 28–34)
MCV RBC AUTO: 91.3 FL (ref 80–100)
MONOCYTES %: 7.4 % (ref 0–11)
NEUTROPHILS #: 2.4 # K/UL (ref 1.4–7.7)
PH UR STRIP: 7 [PH] (ref 5–8)
RBC UR QL: (no result)
UROBILINOGEN URINE: 0.2 EU (ref 0.2–1)

## 2018-02-24 PROCEDURE — S1016 NON-PVC INTRAVENOUS ADMINIST: HCPCS

## 2018-02-24 PROCEDURE — 85025 COMPLETE CBC W/AUTO DIFF WBC: CPT

## 2018-02-24 PROCEDURE — 73030 X-RAY EXAM OF SHOULDER: CPT

## 2018-02-24 PROCEDURE — 73502 X-RAY EXAM HIP UNI 2-3 VIEWS: CPT

## 2018-02-24 PROCEDURE — 99283 EMERGENCY DEPT VISIT LOW MDM: CPT

## 2018-02-24 PROCEDURE — 81002 URINALYSIS NONAUTO W/O SCOPE: CPT

## 2018-02-24 PROCEDURE — 96374 THER/PROPH/DIAG INJ IV PUSH: CPT

## 2018-02-24 PROCEDURE — 80053 COMPREHEN METABOLIC PANEL: CPT

## 2018-02-24 NOTE — DIAGNOSTIC IMAGING REPORT
CYNTHIA DANIELS (NP) - ER 

[facility]

[address] [facility].[address]. [address]

 

 

 

 

Report Submission Date: 2018 2:54:47 AM CST

Patient       Study

Name:   ANASTACIA RONDON       Date:   2018 2:06:22 AM CST

MRN:   [id number]       Modality Type:   DX

Gender:   F       Description:   SHOULDER

:   39       Institution:   [facility]

Physician:   CYNTHIA DANIELS (NP) - ER

     Accession:    [id number]

 

 

3 Views of the left shoulder 

Clinical history: Fall 4 days ago. Pain. 

Findings: Examination of left shoulder multiple views demonstrates left humeral 
prosthesis. There is no evident fracture and no lytic or blastic lesion. 

Impression: 

1. Left humeral prosthesis. 

2. No fracture.

 

Electronically signed on 2018 2:54:47 AM CST by:

Antony RUST

## 2018-02-24 NOTE — DIAGNOSTIC IMAGING REPORT
CYNTHIA DANIELS (NP) - ER 

[facility]

[address] [address]. [address]

 

 

 

 

Report Submission Date: 2018 2:55:44 AM CST

Patient       Study

Name:   ANASTACIA RONDON       Date:   2018 2:15:28 AM CST

MRN:   [id number]       Modality Type:   DX

Gender:   F       Description:   PELVIS

:   39       Institution:   [facility]

Physician:   CYNTHIA DANIELS (NP) - ER

     Accession:    [id number]

 

 

2 views of the left hip 

Clinical history: Fall 4 days ago. Pain. 

Findings: Examination of the left hip in AP and frog leg lateral views 
demonstrates a left total hip replacement. Prosthetic components are normally 
seated in the native bony structures. There is no evident fracture and no lytic 
or blastic lesion. Vascular calcification is incidentally noted. 

Impression: 

1. Left total hip replacement. 

2. No fracture.

 

Electronically signed on 2018 2:55:44 AM CST by:

Antony RUST

## 2018-02-24 NOTE — ED PHYSICIAN DOCUMENTATION
Fall





- HISTORIAN


Historian: patient, other (daughter)





- HPI


Stated Complaint: fall


Chief Complaint: Fall


Onset: today


Where: home


Context: tripped


r: mild


Associated Symptoms:: no loss of consciousness


Location of Pain/Injury: L shoulder, hip (left)


Injury to Right Extremity: none


Injury to Left Extremity: shoulder, hip


Further Comments: yes (82 year old female patient brought in via EMS for 

evaluation after a fall in bathroom.  Patient c/o left shoulder and left hip 

pain.  Daughter at bedside.  Will not allow RN to assess patient, start IV or 

begin treatments.  Multiple negative comments made by daughter about RN.  

Daughter demanding another nurse.  Explained RN was more than capable of 

starting treatments and completing orders.  Encouraged daughter not to delay 

patinet's care.Daughter demanding no xray of shoulder "it needs something more"

.  Explained we would start with xray and progress if necessary.  Daughter 

requesting MRI - shoulder replacement.)





- ROS


CONST: no problems


NEURO: anxiety


MS/SKIN/LYMPH: denies: neck pain, back pain


EYES/ENT: none


CVS/RESP: none


GI/: denies: nausea, vomiting





- PAST HX


Past History: diabetes Type 2, other (neuropathy, HTN, HLD)


Allergies/Adverse Reactions: 


 Allergies











Allergy/AdvReac Type Severity Reaction Status Date / Time


 


No Known Allergies Allergy   Verified 01/27/18 00:11














Home Medications: 


 Ambulatory Orders











 Medication  Instructions  Recorded


 


Multivitamin [Tab-A-Chandan] 1 each PO DAILY 01/24/15


 


Ranitidine HCl [Zantac] 150 mg PO D 07/26/15


 


Folic Acid [FA-8] 0.8 mg PO D 02/22/16


 


Metformin HCl [Metformin HCl ER] 1,000 mg PO HS 12/26/16


 


Atorvastatin Calcium [Lipitor] 20 mg PO HS 08/23/17


 


Cyanocobalamin [Vitamin B-12] 1,000 mcg PO BID 08/23/17


 


Gabapentin [Neurontin] 300 mg PO BID 08/23/17


 


Gabapentin [Neurontin] 600 mg PO HS 02/24/18


 


Ketorolac Tromethamine [Toradol] 10 mg PO TID #15 tablet 02/24/18














- SOCIAL HX


Smoking History: non-smoker





- FAMILY HX


Family History: denies: none





- VITAL SIGNS


Vital Signs: 





 Vital Signs











Temp Pulse Resp BP Pulse Ox


 


 97.9 F   66   18   155/52   95 


 


 02/24/18 01:16  02/24/18 01:16  02/24/18 01:16  02/24/18 01:16  02/24/18 01:16














- REVIEWED ASSESSMENTS


Nursing Assessment  Reviewed: Yes


Vitals Reviewed: Yes





Progress





- Progress


Progress: 





Patient seen in ER in January for fall; history of frequent falls noted





0330 Assisted patient with sitting up; reassessed.  Reviewed lab and xray 

results with patient and daughter.  Medicated for pain with toradol IV. 





Patient extremely sleepy. Assisted to wheel chair for discharge





Reviewed discharge instructions.





ED Results Lab/Radiology





- Radiology


Radiology Impressions: 





3 Views of the left shoulder


Clinical history: Fall 4 days ago. Pain.


Findings: Examination of left shoulder multiple views demonstrates left humeral 

prosthesis. There is no evident fracture and no lytic or blastic lesion.


Impression:


1. Left humeral prosthesis.


2. No fracture.


 


Electronically signed on Feb 24, 2018 2:54:47 AM CST by:


Antony Espinosa








2 views of the left hip


Clinical history: Fall 4 days ago. Pain.


Findings: Examination of the left hip in AP and frog leg lateral views 

demonstrates a left total hip replacement. Prosthetic components are normally 

seated in the native bony structures. There is no evident fracture and no lytic 

or blastic lesion. Vascular calcification is incidentally noted.


Impression:


1. Left total hip replacement.


2. No fracture.


 


Electronically signed on Feb 24, 2018 2:55:44 AM CST by:


Anotny Espinosa





- Orders


Orders: 





 ED Orders











 Category Date Time Status


 


 Place IV Lock 1T Care  02/24/18 01:32 Active


 


 LT HIP 2VIEW COMPLETE [RAD] Urgent Exams  02/24/18 01:32 Ordered


 


 SHOULDER 2 VIEWS OR MORE [RAD] Stat Exams  02/24/18 01:32 Ordered


 


 CBC/PLATELET/DIFF Routine Lab  02/24/18 01:31 Ordered


 


 CMP Routine Lab  02/24/18 01:31 Ordered


 


 URINALYSIS Routine Lab  02/24/18 01:31 Ordered














Fall Physical Exam





- Physical Exam


General Appearance: no acute distress, alert


Head: non-tender, no swelling, no obvious injury


Neck: non-tender, painless ROM, trachea midline


Eye: DUSTIN


Resp/CVS: chest non-tender, no ecchymosis, breath sounds nml, no resp. distress

, heart sounds nml


Abdomen: soft, no organomegaly, normal bowel sounds, no abdominal bruit, no 

distension


Neuro: oriented x3, CN's nml as tested, sensation nml, motor nml, mood/affect 

nml,  nml, reflexes nml,  symmetrical


Skin: color nml, no rash, nml palp., dry


Back: normal inspection, no CVA tenderness


Extremities: atraumatic, pelvis stable, hips non-tender, no pedal edema, nml ROM

, nml color/temp, other (left hip with no ecchymosis or abrasion; left shoulder 

with obvious injury, no ecchymosis or abrasion.  Healed incision noted. )





- [place] Coma Score


Eyes Open: Spontaneous


Speech: Oriented


Motor: Obeys Commands





Discharge


Clincal Impression: 


 Difficulty with family





Fall


Qualifiers:


 Encounter type: initial encounter Qualified Code(s): W19.XXXA - Unspecified 

fall, initial encounter





Contusion of shoulder


Qualifiers:


 Encounter type: initial encounter Laterality: left Qualified Code(s): S40.012A 

- Contusion of left shoulder, initial encounter





Prescriptions: 


Ketorolac Tromethamine [Toradol] 10 mg PO TID #15 tablet


Referrals: 


Johnnie Garcia MD [Primary Care Provider] - 2 Days


Condition: Stable


Disposition: 01 HOME, SELF-CARE


Decision to Admit: NO


Decision Time: 03:27

## 2018-03-07 ENCOUNTER — HOSPITAL ENCOUNTER (INPATIENT)
Dept: HOSPITAL 44 - ED | Age: 83
LOS: 4 days | Discharge: NURSING, REHAB OR LONG-TERM CARE | DRG: 191 | End: 2018-03-11
Attending: FAMILY MEDICINE | Admitting: FAMILY MEDICINE
Payer: MEDICARE

## 2018-03-07 VITALS — BODY MASS INDEX: 29.2 KG/M2

## 2018-03-07 DIAGNOSIS — Z91.81: ICD-10-CM

## 2018-03-07 DIAGNOSIS — E87.1: ICD-10-CM

## 2018-03-07 DIAGNOSIS — D50.9: ICD-10-CM

## 2018-03-07 DIAGNOSIS — F17.210: ICD-10-CM

## 2018-03-07 DIAGNOSIS — E11.9: ICD-10-CM

## 2018-03-07 DIAGNOSIS — I25.10: ICD-10-CM

## 2018-03-07 DIAGNOSIS — I10: ICD-10-CM

## 2018-03-07 DIAGNOSIS — J44.1: Primary | ICD-10-CM

## 2018-03-07 DIAGNOSIS — N28.9: ICD-10-CM

## 2018-03-07 LAB
EGFR (AFRICAN): > 60
EGFR (NON-AFRICAN): > 60

## 2018-03-07 PROCEDURE — 99232 SBSQ HOSP IP/OBS MODERATE 35: CPT

## 2018-03-07 PROCEDURE — 36600 WITHDRAWAL OF ARTERIAL BLOOD: CPT

## 2018-03-07 PROCEDURE — 36415 COLL VENOUS BLD VENIPUNCTURE: CPT

## 2018-03-07 PROCEDURE — S1016 NON-PVC INTRAVENOUS ADMINIST: HCPCS

## 2018-03-07 PROCEDURE — 82803 BLOOD GASES ANY COMBINATION: CPT

## 2018-03-07 PROCEDURE — 71045 X-RAY EXAM CHEST 1 VIEW: CPT

## 2018-03-07 PROCEDURE — 94640 AIRWAY INHALATION TREATMENT: CPT

## 2018-03-07 PROCEDURE — 99284 EMERGENCY DEPT VISIT MOD MDM: CPT

## 2018-03-07 PROCEDURE — 97165 OT EVAL LOW COMPLEX 30 MIN: CPT

## 2018-03-07 PROCEDURE — 99222 1ST HOSP IP/OBS MODERATE 55: CPT

## 2018-03-07 PROCEDURE — 80053 COMPREHEN METABOLIC PANEL: CPT

## 2018-03-07 PROCEDURE — 99238 HOSP IP/OBS DSCHRG MGMT 30/<: CPT

## 2018-03-07 PROCEDURE — 85025 COMPLETE CBC W/AUTO DIFF WBC: CPT

## 2018-03-07 PROCEDURE — 97535 SELF CARE MNGMENT TRAINING: CPT

## 2018-03-07 NOTE — ED PHYSICIAN DOCUMENTATION
Dyspnea





- HISTORIAN


Historian: patient





- HPI


Stated Complaint: Shortness of air


Chief Complaint: Dyspnea


Additional Information: 





noncompliant patient, respiratory history, here often, brought by nurse 

daughter who says she is worse today. They have appt in AM with PCP. Martir 

says she's had all her breathing treatments today. 


Onset: hours


Duration: continues in ED


Severity: mild


Exacerbated By: change in position, exertion


Associated Symptoms: none


Further Comments: no





- ROS


CONST: no problems


EYES/ENT: none


GI/: none


MS/SKIN/LYMPH: none





- PAST HX


Lung Disease: asthma, COPD


Cardiac Disease: none


PE Risk Factors: none


Surgeries/Procedures: none


Other History: none


Allergies/Adverse Reactions: 


 Allergies











Allergy/AdvReac Type Severity Reaction Status Date / Time


 


No Known Allergies Allergy   Verified 03/07/18 20:54














Home Medications: 


 Ambulatory Orders











 Medication  Instructions  Recorded


 


Multivitamin [Tab-A-Chandan] 1 each PO DAILY 01/24/15


 


Ranitidine HCl [Zantac] 150 mg PO D 07/26/15


 


Folic Acid [FA-8] 0.8 mg PO D 02/22/16


 


Metformin HCl [Metformin HCl ER] 1,000 mg PO HS 12/26/16


 


Atorvastatin Calcium [Lipitor] 20 mg PO HS 08/23/17


 


Cyanocobalamin [Vitamin B-12] 1,000 mcg PO BID 08/23/17


 


Gabapentin [Neurontin] 300 mg PO BID 08/23/17


 


Gabapentin [Neurontin] 600 mg PO HS 02/24/18


 


Ketorolac Tromethamine [Toradol] 10 mg PO TID #15 tablet 02/24/18














- SOCIAL HX


Smoking History: cigarettes


Alcohol Use: none


Drug Use: none





- FAMILY HX


Family History: none





- VITAL SIGNS


Vital Signs: 





 Vital Signs











Temp Pulse Resp BP Pulse Ox


 


 98.2 F   88   24   122/33   88 L


 


 03/07/18 20:42  03/07/18 20:42  03/07/18 20:42  03/07/18 20:42  03/07/18 20:42














- REVIEWED ASSESSMENTS


Nursing Assessment  Reviewed: Yes


Vitals Reviewed: Yes





ED Results Lab/Radiology





- Orders


Orders: 





 ED Orders











 Category Date Time Status


 


 Assess pulse oximetry Q1H Care  03/07/18 21:07 Ordered


 


 CHEST 1VIEW [RAD] Stat Exams  03/07/18 Ordered


 


 ARTERIAL BLOOD GAS Stat Lab  03/07/18 Uncollected


 


 CBC/PLATELET/DIFF Routine Lab  03/07/18 Ordered


 


 CMP Routine Lab  03/07/18 Ordered


 


 Ipratropium/Albuterol Sulfate [Duoneb] Med  03/07/18 21:07 Once





 3 ml NEB NOW ONE   


 


 Oxygen Daily Oxygen  03/07/18 21:15 Ordered


 


 EKG WITH COMPARISON Stat Ther  03/07/18 Ordered














Dyspnea Physical Exam





- EXAM


General Appearance: no acute distress, alert


EENT: eye inspection normal, ENT inspection normal, pharynx normal, no signs of 

dehydration


Neck: nml inspection


Respiratory: no resp. distress, speaks full sentences, wheezes


CVS: reg. rate & rhythm


Abdomen: non-tender


Skin: color nml, no rash


Extremities: non-tender, normal range of motion


Neuro/Psych: oriented x3





Discharge


Clincal Impression: 


 Hyperglycemia, Hyponatremia, Renal insufficiency, mild, Uremia, Hypoxemia, Non-

compliant behavior, Difficulty with family





Sleep apnea


Qualifiers:


 Sleep apnea type: unspecified type Qualified Code(s): G47.30 - Sleep apnea, 

unspecified





Obesity


Qualifiers:


 Obesity type: unspecified obesity type Obesity classification: unspecified 

obesity classification Serious obesity comorbidity presence: with serious 

comorbidity Qualified Code(s): E66.9 - Obesity, unspecified





Anemia


Qualifiers:


 Anemia type: unspecified type Qualified Code(s): D64.9 - Anemia, unspecified





Referrals: 


Johnnie Garcia MD [Primary Care Provider] - 2 Days


Condition: Stable


Disposition: 09 ADMITTED AS INPATIENT


Decision to Admit: [id number]


Date of Decison to Admit: 03/07/18


Decision Time: 23:02

## 2018-03-08 LAB
BASE EXCESS STD BLDA CALC-SCNC: 6.4 MMOL/L
BASOPHILS NFR BLD: 0.5 % (ref 0–1.5)
EGFR (AFRICAN): > 60
EGFR (NON-AFRICAN): > 60
EOSINOPHIL NFR BLD: 1 % (ref 0–6.8)
MCH RBC QN AUTO: 29.6 PG (ref 28–34)
MCV RBC AUTO: 89.7 FL (ref 80–100)
MONOCYTES %: 6.6 % (ref 0–11)
NEUTROPHILS #: 3.5 # K/UL (ref 1.4–7.7)
PCO2 BLDA: 45 MMHG (ref 35–48)
PH BLDA: 7.45 [PH] (ref 7.35–7.45)
PO2 BLDA: 57 MMHG (ref 83–108)
SAO2 % BLDA FROM PO2: 92 % (ref 93–100)

## 2018-03-08 RX ADMIN — GABAPENTIN SCH: 300 CAPSULE ORAL at 20:14

## 2018-03-08 RX ADMIN — MULTIVITAMIN TABLET SCH EACH: TABLET at 09:14

## 2018-03-08 RX ADMIN — SODIUM CHLORIDE SCH UNITS: 9 INJECTION, SOLUTION INTRAVENOUS at 18:14

## 2018-03-08 RX ADMIN — GABAPENTIN SCH MG: 300 CAPSULE ORAL at 09:13

## 2018-03-08 RX ADMIN — ATORVASTATIN CALCIUM SCH MG: 80 TABLET, FILM COATED ORAL at 20:14

## 2018-03-08 RX ADMIN — FLUDROCORTISONE ACETATE SCH MG: 0.1 TABLET ORAL at 09:13

## 2018-03-08 RX ADMIN — CITALOPRAM SCH MG: 20 TABLET ORAL at 09:14

## 2018-03-08 RX ADMIN — BUDESONIDE SCH MG: 0.5 SUSPENSION RESPIRATORY (INHALATION) at 21:11

## 2018-03-08 RX ADMIN — GABAPENTIN SCH MG: 300 CAPSULE ORAL at 20:14

## 2018-03-08 RX ADMIN — BUDESONIDE SCH MG: 0.5 SUSPENSION RESPIRATORY (INHALATION) at 10:21

## 2018-03-08 RX ADMIN — CYANOCOBALAMIN TAB 1000 MCG SCH MCG: 1000 TAB at 20:16

## 2018-03-08 RX ADMIN — SODIUM CHLORIDE SCH UNITS: 9 INJECTION, SOLUTION INTRAVENOUS at 12:51

## 2018-03-08 RX ADMIN — CYANOCOBALAMIN TAB 1000 MCG SCH MCG: 1000 TAB at 09:14

## 2018-03-08 RX ADMIN — GABAPENTIN SCH MG: 100 CAPSULE ORAL at 20:14

## 2018-03-08 RX ADMIN — LISINOPRIL SCH MG: 20 TABLET ORAL at 09:13

## 2018-03-08 NOTE — DIAGNOSTIC IMAGING REPORT
CIRA WEAVER 

[facility]

[address] [facility] [address] [address]

 

 

 

 

Report Submission Date: Mar 7, 2018 9:42:24 PM CST

Patient       Study

Name:   ANASTACIA RONDON       Date:   Mar 7, 2018 9:31:36 PM CST

MRN:   [id number]       Modality Type:   DX

Gender:   F       Description:   CHEST

:   39       Institution:   [facility]

Physician:   CIRA WEAVER

     Accession:    [id number]

 

 

Portable view of the chest 



Clinical history: Dyspnea 



Findings: 



The heart size at upper limits of normal. The pulmonary vasculature is normal. 
No pleural effusion, pneumothorax or alveolar consolidation. 



Impression: 



Negative

 

Electronically signed on Mar 7, 2018 9:42:24 PM CST by:

Chuy RUST

## 2018-03-08 NOTE — HISTORY AND PHYSICAL REPORT
History of Present Illnes





- History of Present Illness


Reason for Visit: dyspnea


History of Present Illness: 





82-year-old white female with a history of COPD. Patient does having some 

increasing shortness of breath dyspnea. Patient is on home oxygen at bedtime. 

Patient does have a history of sleep apnea. 24 hours prior to admission patient 

does having some increasing shortness of breath dyspnea on exertion. Patient 

stated that she had a slight productive cough up some clear to slightly yellow 

phlegm. No fever or chills noted. Patient was brought to the emergency room was 

felt to be having a mild exacerbation of her COPD and was admitted to 

observation care. On the morning following admission patient continues to have 

significant expiratory wheezing and needed supplemental oxygen in order to 

maintain her SaO2 greater than 92%. Patient was subsequently admitted to acute 

care for further aggressive treatment of her COPD exacerbation with IV steroids 

and high flow nebulization treatments.





- Past Medical History


Cardiac: CAD, HTN, Valve insufficiency


Pulmonary: COPD


CNS: Peripheral neuropathy


Gastrointestinal: Other (history of pancreatitis)


Heme/Onc: Iron deficiency anemia


Renal/: Other (chronic hyponatremia)


Endocrine: Diabetes (type 2)





- Past Surgical History


Past Surgical History: Appendectomy, Hysterectomy (total), Total Hip Replacement

, Tonsillectomy, Other (AP Bladder suspension, PCI with stenting, nerve 

stimulator implantation, total shoulder replacement)





- Past Social History


Smoke: <1 pack per day


Occupation: retired


Alcohol: None


Drugs: None


Lives: Alone


Domestic Violence: Negative





- Health Maintenance


Health Maintenance: Cholesterol, Influenza Vaccine, Pneumococcal Vaccine, 

Mammogram


Influenza Vaccine: Current for this Influenza Season


Pneumonia Vaccine: Yes (Prevnar and pneumo 23)


Resuscitation Status: 


Resusciation Status





Resuscitation Status             Full Code














- Unable to Obtain History


Unable to Obtain: No





Review of Systems





- Review of Systems


Constitutional: Weakness.  negative: Fever, Chills


Eyes: negative: vision change


ENT: negative: Ear Pain, Ear Discharge, Nose Pain, Nose Discharge, Nose 

Congestion, Throat Pain, Throat Swelling


Respiratory: Cough, Dry, Shortness of Breath, SOB with Excertion.  negative: 

Hemoptysis, Pleuritic Pain, Sputum, Wheezing


Cardiovascular: negative: Chest Pain, Palpitations, Orthopnea, Paroxysmal Noc. 

Dyspnea, Edema, Light Headedness


Gastrointestinal: Constipation.  negative: Nausea, Vomiting, Abdominal Pain, 

Diarrhea, Melena, Hematochezia


Genitourinary: Incontinence (mild stress).  negative: Dysuria, Frequency


Musculoskeletal: Shoulder Pain.  negative: Neck Pain


Skin: negative: Rash


Neurological: Weakness.  negative: Numbness, Incoordination, Change in Speech, 

Confusion, Seizures





- Medications/Allergies


Allergies/Adverse Reactions: 


 Allergies











Allergy/AdvReac Type Severity Reaction Status Date / Time


 


No Known Allergies Allergy   Verified 03/07/18 20:54














Current Inpatient Medications: 


 Current Inpatient Medications





Albuterol/Ipratropium (Duoneb)  3 ml NEB Q4 PRN


   PRN Reason: Wheezing


Atorvastatin Calcium (Lipitor)  20 mg PO HS [facility]


Budesonide (Pulmicort)  0.5 mg NEB BID [facility]


   Last Admin: 03/08/18 10:21 Dose:  0.5 mg


Citalopram Hydrobromide (Celexa)  10 mg PO DAILY [facility]


   Last Admin: 03/08/18 09:14 Dose:  10 mg


Cyanocobalamin (Vitamin B-12)  1,000 mcg PO BID [facility]


   Last Admin: 03/08/18 09:14 Dose:  1,000 mcg


Fludrocortisone Acetate (Florinef)  0.1 mg PO QDAY [facility]


   Last Admin: 03/08/18 09:13 Dose:  0.1 mg


Gabapentin (Neurontin)  300 mg PO BID [facility]


   Last Admin: 03/08/18 09:13 Dose:  300 mg


Gabapentin (Neurontin)  600 mg PO HS [facility]


Insulin Human Regular (Humulin R)  0 - 12 unit SQ CHEMX3 [facility]


   PRN Reason: Protocol


Levothyroxine Sodium (Levothyroxine Sodium)  125 mcg PO 0700 [facility]


Lisinopril (Prinivil)  40 mg PO D [facility]


   Last Admin: 03/08/18 09:13 Dose:  40 mg


Metformin HCl (Glucophage)  500 mg PO [id number] [facility]


Methylprednisolone Sodium Succinate (Solu-Medrol)  80 mg IVP Q12 [facility]


Miscellaneous (Chem Sticks)  1 each  CHEMQID [facility]


Multivitamins (Tab-A-Chandan)  1 each PO DAILY [facility]


   Last Admin: 03/08/18 09:14 Dose:  1 each














Exam





- Exam


Vital Signs: 


 Vital Signs (72 hours)











  03/07/18 03/08/18 03/08/18





  23:55 00:19 01:31


 


Temperature  97.1 F L 


 


Pulse Rate [   





Left]   


 


Pulse Rate [ 88 75 





Pulse ox]   


 


Respiratory 22 18 





Rate   


 


Blood Pressure 133/48 136/78 





[Left Arm]   


 


O2 Sat by Pulse 88 L 92 91 L





Oximetry   














  03/08/18 03/08/18 03/08/18





  02:45 03:15 04:00


 


Temperature 97.2 F L  


 


Pulse Rate [   





Left]   


 


Pulse Rate [ 85  





Pulse ox]   


 


Respiratory 20  





Rate   


 


Blood Pressure 136/57  





[Left Arm]   


 


O2 Sat by Pulse 96 97 96





Oximetry   














  03/08/18 03/08/18 03/08/18





  05:00 06:00 06:05


 


Temperature   97.3 F L


 


Pulse Rate [   





Left]   


 


Pulse Rate [   78





Pulse ox]   


 


Respiratory   22





Rate   


 


Blood Pressure   138/69





[Left Arm]   


 


O2 Sat by Pulse 96 97 97





Oximetry   














  03/08/18





  10:00


 


Temperature 98.9 F


 


Pulse Rate [ 72





Left] 


 


Pulse Rate [ 





Pulse ox] 


 


Respiratory 24





Rate 


 


Blood Pressure 143/69





[Left Arm] 


 


O2 Sat by Pulse 98





Oximetry 














General: Alert, Oriented to Person, Oriented to Place, Oriented to Time, 

Cooperative, Mild distress


HEENT: Atraumatic, PERRLA, EOMI, Mouth Mucous membr. moist/Pink, Nose Mucous 

membr. moist/Pink, Edentulous


Neck: Normal Range of Motion


Carotids: 





WNL


Thyroid: 





WNL


Lungs: Normal air movement, Speaks full Sentences, Wheezes (expiratory with 

prolng expiratory phase), Rales (few)


Cardiovascular: Regular rate, Normal S1, Normal S2, No murmurs


Murmur: No: Systolic Murmur


Abdomen: Normal bowel sounds, Soft, No tenderness, No hepatospenomegaly, No 

masses


Integumentary: Normal, Pink, Warm, Dry


Extremities: No clubbing, No cyanosis, No edema, Normal pulses, No tenderness/

swelling


Neurological: Normal gait, Normal speech, Strength Equal Bilat, Normal tone, 

Sensation intact, Cranial nerves 3-12 NL, Reflexes 2+


Psych/Mental Status: Mental status NL, Mood NL, Appropriate Affect, Intact 

Judgment (mildly confused)





- Laboratory Results


Laboratory Results: 


 Laboratory Results











  03/07/18 03/08/18 03/08/18





  Unknown 08:19 08:19


 


WBC   4.80 


 


RBC   3.69 L 


 


Hgb   10.9 L 


 


Hct   33.1 L 


 


MCV   89.7 


 


MCH   29.6 


 


MCHC   33.0 


 


RDW   14.1 


 


Plt Count   239 


 


Neut % (Auto)   71.5 


 


Lymph % (Auto)   18.3 


 


Mono % (Auto)   6.6 


 


Eos % (Auto)   1.0 


 


Baso % (Auto)   0.5 


 


Neut # (Auto)   3.5 


 


Lymph # (Auto)   0.9 


 


Mono # (Auto)   0.3 


 


Eos # (Auto)   0.0 


 


Baso # (Auto)   0.0 


 


Reactive Lymphs %   2.1 


 


Reactive Lymphs #   0.1 


 


Sodium  127 L   130 L


 


Potassium  3.4 L   3.9


 


Chloride  86 L   88 L


 


Carbon Dioxide  29   30


 


BUN  23 H   21 H


 


Creatinine  1.10 H   0.90


 


Estimated Creat Clear  53   65


 


Est GFR ( Amer)  > 60   > 60


 


Est GFR (Non-Af Amer)  > 60   > 60


 


Glucose  246 H   235 H


 


Calcium  8.6   8.7


 


Total Bilirubin  0.7   0.8


 


AST  21   22


 


ALT  25   28


 


Alkaline Phosphatase  87   83


 


Total Protein  6.1 L   5.8 L


 


Albumin  3.5   3.4 L

















Assessment/Plan





- Assessment/Plan


(1) COPD with acute exacerbation


Status: Acute   Current Visit: Yes   


Assessment: 


Will start on IV steroids and continue with HFN of duoneb








(2) Hyponatremia


Status: Acute   Current Visit: Yes   


Assessment: 


chronic, will montior








(3) Renal insufficiency, mild


Status: Acute   Current Visit: Yes   


Assessment: 


stable at baseline will monitor








(4) Coronary arteriosclerosis


Status: Acute   Current Visit: No   


Assessment: 


stable continue with present medications








(5) Fall at home


Status: Acute   Current Visit: No   





(6) Diabetes mellitus type 2


Status: Chronic   Current Visit: No   


Assessment: 


stable will start with sliding scale insulin if needed








VTE Assessment





- RISK FACTOR SCORE


VTE RISK FACTOR SCORES: AGE OVER 60 YEARS, ACUTE RESPIRATORY FAILURE/SEVERE COPD

, ANTICIPATED BED CONFINEMENT OR IMMOBILIZATION > 24 HOURS





- RISK


VTE HIGH RISK: SCORE OF 3-4 (RISK PROXIMAL DVT 4-8%)  PROPHYLAXIS NEEDED

## 2018-03-09 RX ADMIN — BUDESONIDE SCH MG: 0.5 SUSPENSION RESPIRATORY (INHALATION) at 20:15

## 2018-03-09 RX ADMIN — SODIUM CHLORIDE SCH UNITS: 9 INJECTION, SOLUTION INTRAVENOUS at 13:15

## 2018-03-09 RX ADMIN — CYANOCOBALAMIN TAB 1000 MCG SCH MCG: 1000 TAB at 20:02

## 2018-03-09 RX ADMIN — ATORVASTATIN CALCIUM SCH MG: 80 TABLET, FILM COATED ORAL at 20:03

## 2018-03-09 RX ADMIN — SODIUM CHLORIDE SCH UNITS: 9 INJECTION, SOLUTION INTRAVENOUS at 18:00

## 2018-03-09 RX ADMIN — SODIUM CHLORIDE SCH UNITS: 9 INJECTION, SOLUTION INTRAVENOUS at 07:58

## 2018-03-09 RX ADMIN — LEVOTHYROXINE SODIUM SCH MCG: 50 TABLET ORAL at 05:57

## 2018-03-09 RX ADMIN — CYANOCOBALAMIN TAB 1000 MCG SCH MCG: 1000 TAB at 10:01

## 2018-03-09 RX ADMIN — MULTIVITAMIN TABLET SCH EACH: TABLET at 10:01

## 2018-03-09 RX ADMIN — LISINOPRIL SCH MG: 20 TABLET ORAL at 10:00

## 2018-03-09 RX ADMIN — BUDESONIDE SCH MG: 0.5 SUSPENSION RESPIRATORY (INHALATION) at 09:00

## 2018-03-09 RX ADMIN — GABAPENTIN SCH MG: 100 CAPSULE ORAL at 20:04

## 2018-03-09 RX ADMIN — FLUDROCORTISONE ACETATE SCH MG: 0.1 TABLET ORAL at 10:00

## 2018-03-09 RX ADMIN — CITALOPRAM SCH MG: 20 TABLET ORAL at 10:00

## 2018-03-10 RX ADMIN — SODIUM CHLORIDE SCH: 9 INJECTION, SOLUTION INTRAVENOUS at 16:44

## 2018-03-10 RX ADMIN — GABAPENTIN SCH MG: 300 CAPSULE ORAL at 21:55

## 2018-03-10 RX ADMIN — GABAPENTIN SCH MG: 100 CAPSULE ORAL at 21:53

## 2018-03-10 RX ADMIN — GABAPENTIN SCH MG: 300 CAPSULE ORAL at 09:04

## 2018-03-10 RX ADMIN — CYANOCOBALAMIN TAB 1000 MCG SCH MCG: 1000 TAB at 21:53

## 2018-03-10 RX ADMIN — BUDESONIDE SCH MG: 0.5 SUSPENSION RESPIRATORY (INHALATION) at 09:19

## 2018-03-10 RX ADMIN — ATORVASTATIN CALCIUM SCH MG: 80 TABLET, FILM COATED ORAL at 21:53

## 2018-03-10 RX ADMIN — CITALOPRAM SCH MG: 20 TABLET ORAL at 09:03

## 2018-03-10 RX ADMIN — SODIUM CHLORIDE SCH UNITS: 9 INJECTION, SOLUTION INTRAVENOUS at 16:58

## 2018-03-10 RX ADMIN — BUDESONIDE SCH MG: 0.5 SUSPENSION RESPIRATORY (INHALATION) at 21:57

## 2018-03-10 RX ADMIN — LEVOTHYROXINE SODIUM SCH MCG: 50 TABLET ORAL at 06:14

## 2018-03-10 RX ADMIN — FLUDROCORTISONE ACETATE SCH MG: 0.1 TABLET ORAL at 09:04

## 2018-03-10 RX ADMIN — MULTIVITAMIN TABLET SCH EACH: TABLET at 09:06

## 2018-03-10 RX ADMIN — SODIUM CHLORIDE SCH UNITS: 9 INJECTION, SOLUTION INTRAVENOUS at 08:37

## 2018-03-10 RX ADMIN — CYANOCOBALAMIN TAB 1000 MCG SCH MCG: 1000 TAB at 09:05

## 2018-03-10 RX ADMIN — SODIUM CHLORIDE SCH UNITS: 9 INJECTION, SOLUTION INTRAVENOUS at 12:38

## 2018-03-10 RX ADMIN — LISINOPRIL SCH MG: 20 TABLET ORAL at 09:05

## 2018-03-10 RX ADMIN — SODIUM CHLORIDE SCH: 9 INJECTION, SOLUTION INTRAVENOUS at 12:37

## 2018-03-10 NOTE — INPATIENT PROGRESS NOTE
Subjective





- Required Recertification Statement


I anticipate X number of days because-include discharge plan: 1 day





- Review of Systems


Events since last encounter: 





Patient seemed to be doing better today than she was yesterday. Patient 

continues to have some expiratory weaving. Patient is able to ambulate some 

better. With ambulation on room air patient did drop her SaO2 down to 83%.


HEENT: Denies: Head Aches


Pulmonary: Dyspnea, Cough.  Denies: Pleuritic Chest Pain


Cardiovascular: Denies: Chest Pain, Palpitations, Orthopnea, Paroxysmal Noc. 

Dyspnea


Gastrointestinal: Denies: Nausea, Vomiting, Abdominal Pain


Genitourinary: Denies: Dysuria





Objective





- Exam


Vitals and I&O: 


 Vital Signs











Temp  98.1 F   03/10/18 06:00


 


Pulse  84   03/10/18 06:00


 


Resp  20   03/10/18 06:00


 


BP  118/60   03/10/18 06:00


 


Pulse Ox  95   03/10/18 06:00











 Intake & Output











 03/09/18 03/09/18 03/10/18





 11:59 23:59 11:59


 


Intake Total 600 1060 250


 


Balance 600 1060 250


 


Intake:   


 


  IV   100


 


    Left Forearm   100


 


  Oral 600 1060 150


 


Other:   


 


  Voiding Method Bedside Commode Toilet Toilet


 


  # Voids 3 3 1


 


  # Bowel Movements  0 














General: Alert, Oriented to Person, Oriented to Place, Oriented to Time, 

Cooperative


HEENT: Atraumatic, PERRLA, EOMI, Mouth Mucous membr. moist/Pink, Nose Mucous 

membr. moist/Pink


Neck: Supple, No JVD, No thyromegaly


Lungs: Speaks full Sentences, Respiratory Distress, Wheezes, Prolonged 

Expiration.  No: Normal air movement


Cardiovascular: Regular rate, Normal S1, Normal S2, No murmurs


Abdomen: Normal bowel sounds, Soft, No tenderness, No hepatospenomegaly, No 

masses


Extremities: No clubbing, No cyanosis, No edema, Normal pulses, No tenderness/

swelling


Skin: Normal, Pink, Warm, Dry


Neurological: Normal gait, Normal speech, Strength Equal Bilat, Normal tone, 

Sensation intact, Cranial nerves 3-12 NL, Reflexes 2+


Psych/Mental Status: Mental status NL, Mood NL, Appropriate Affect





- Results


Results: 


 Laboratory Results











WBC  4.80 K/ul (4.00-12.00)   03/08/18  08:19    


 


RBC  3.69 M/ul (3.90-5.20)  L  03/08/18  08:19    


 


Hgb  10.9 g/dL (12.0-16.0)  L  03/08/18  08:19    


 


Hct  33.1 % (34.5-46.5)  L  03/08/18  08:19    


 


MCV  89.7 fl (80.0-100.0)   03/08/18  08:19    


 


MCH  29.6 pg (28.0-34.0)   03/08/18  08:19    


 


MCHC  33.0 g/dL (30.0-36.0)   03/08/18  08:19    


 


RDW  14.1 % (11.3-14.3)   03/08/18  08:19    


 


Plt Count  239 K/mm3 (130-400)   03/08/18  08:19    


 


Neut % (Auto)  71.5 % (39.0-79.0)   03/08/18  08:19    


 


Lymph % (Auto)  18.3 % (16.0-50.0)   03/08/18  08:19    


 


Mono % (Auto)  6.6 % (0.0-11.0)   03/08/18  08:19    


 


Eos % (Auto)  1.0 % (0.0-6.8)   03/08/18  08:19    


 


Baso % (Auto)  0.5  (0.0-1.5)   03/08/18  08:19    


 


Neut # (Auto)  3.5 # k/uL (1.4-7.7)   03/08/18  08:19    


 


Lymph # (Auto)  0.9 # k/uL (0.6-4.0)   03/08/18  08:19    


 


Mono # (Auto)  0.3 # k/uL (0.0-0.9)   03/08/18  08:19    


 


Eos # (Auto)  0.0 # k/uL (0.0-0.6)   03/08/18  08:19    


 


Baso # (Auto)  0.0 # k/uL (0.0-0.5)   03/08/18  08:19    


 


Reactive Lymphs %  2.1 % (0.0-5.0)   03/08/18  08:19    


 


Reactive Lymphs #  0.1 # k/uL (0.0-0.8)   03/08/18  08:19    


 


pH  7.45  (7.35-7.45)   03/07/18  21:30    


 


pCO2  45 mmhg (35-48)   03/07/18  21:30    


 


pO2  57 mmhg ()  L  03/07/18  21:30    


 


HCO3  29.7 Meq/L (21-28)  H  03/07/18  21:30    


 


ABG O2 Sat Calc/Susan  92 % ()  L  03/07/18  21:30    


 


ABG Base Excess  6.4  (-2 - +2)  H  03/07/18  21:30    


 


Sodium  130 mmol/L (136-145)  L  03/08/18  08:19    


 


Potassium  3.9 mmol/L (3.5-5.1)   03/08/18  08:19    


 


Chloride  88 mmol/L ()  L  03/08/18  08:19    


 


Carbon Dioxide  30 mmol/L (22-30)   03/08/18  08:19    


 


BUN  21 mg/dL (7-17)  H  03/08/18  08:19    


 


Creatinine  0.90 mg/dL (0.52-1.04)   03/08/18  08:19    


 


Estimated Creat Clear  65   03/08/18  08:19    


 


Est GFR ( Amer)  > 60  (60-)  03/08/18  08:19    


 


Est GFR (Non-Af Amer)  > 60  (60-)  03/08/18  08:19    


 


Glucose  235 mg/dL ()  H  03/08/18  08:19    


 


Calcium  8.7 mg/dL (8.4-10.2)   03/08/18  08:19    


 


Total Bilirubin  0.8 mg/dL (0.2-1.3)   03/08/18  08:19    


 


AST  22 U/L (15-46)   03/08/18  08:19    


 


ALT  28 U/L (13-69)   03/08/18  08:19    


 


Alkaline Phosphatase  83 U/L ()   03/08/18  08:19    


 


Total Protein  5.8 g/dL (6.3-8.2)  L  03/08/18  08:19    


 


Albumin  3.4 g/dL (3.5-5.0)  L  03/08/18  08:19    

















Assessment/Plan





- Assessment/Plan


(1) COPD with acute exacerbation


Status: Acute   Current Visit: Yes   


Assessment: 


Continue with IV steroids and nebulized treatments.








(2) Hyponatremia


Status: Acute   Current Visit: Yes   


Assessment: 


stable








(3) Renal insufficiency, mild


Status: Acute   Current Visit: Yes   


Assessment: 


stable








(4) Coronary arteriosclerosis


Status: Acute   Current Visit: No   


Assessment: 


stable








(5) Fall at home


Status: Acute   Current Visit: No   





(6) Diabetes mellitus type 2


Status: Chronic   Current Visit: No   


Assessment: 


BS in the 200s

## 2018-03-10 NOTE — INPATIENT PROGRESS NOTE
Subjective





- Required Recertification Statement


I anticipate X number of days because-include discharge plan: 2 days





- Review of Systems


Events since last encounter: 





Patient does seem to be doing some better today. Patient (having some shortness 

of breath dyspnea with exertion. Patient is having some mild laryngitis 

symptoms. Patient continues to have a nonproductive cough.





Objective





- Exam


Vitals and I&O: 


 Vital Signs











Temp  98.1 F   03/10/18 06:00


 


Pulse  84   03/10/18 06:00


 


Resp  20   03/10/18 06:00


 


BP  118/60   03/10/18 06:00


 


Pulse Ox  95   03/10/18 06:00











 Intake & Output











 03/09/18 03/09/18 03/10/18





 11:59 23:59 11:59


 


Intake Total 600 1060 250


 


Balance 600 1060 250


 


Intake:   


 


  IV   100


 


    Left Forearm   100


 


  Oral 600 1060 150


 


Other:   


 


  Voiding Method Bedside Commode Toilet Toilet


 


  # Voids 3 3 1


 


  # Bowel Movements  0 

















- Results


Results: 


 Laboratory Results











WBC  4.80 K/ul (4.00-12.00)   03/08/18  08:19    


 


RBC  3.69 M/ul (3.90-5.20)  L  03/08/18  08:19    


 


Hgb  10.9 g/dL (12.0-16.0)  L  03/08/18  08:19    


 


Hct  33.1 % (34.5-46.5)  L  03/08/18  08:19    


 


MCV  89.7 fl (80.0-100.0)   03/08/18  08:19    


 


MCH  29.6 pg (28.0-34.0)   03/08/18  08:19    


 


MCHC  33.0 g/dL (30.0-36.0)   03/08/18  08:19    


 


RDW  14.1 % (11.3-14.3)   03/08/18  08:19    


 


Plt Count  239 K/mm3 (130-400)   03/08/18  08:19    


 


Neut % (Auto)  71.5 % (39.0-79.0)   03/08/18  08:19    


 


Lymph % (Auto)  18.3 % (16.0-50.0)   03/08/18  08:19    


 


Mono % (Auto)  6.6 % (0.0-11.0)   03/08/18  08:19    


 


Eos % (Auto)  1.0 % (0.0-6.8)   03/08/18  08:19    


 


Baso % (Auto)  0.5  (0.0-1.5)   03/08/18  08:19    


 


Neut # (Auto)  3.5 # k/uL (1.4-7.7)   03/08/18  08:19    


 


Lymph # (Auto)  0.9 # k/uL (0.6-4.0)   03/08/18  08:19    


 


Mono # (Auto)  0.3 # k/uL (0.0-0.9)   03/08/18  08:19    


 


Eos # (Auto)  0.0 # k/uL (0.0-0.6)   03/08/18  08:19    


 


Baso # (Auto)  0.0 # k/uL (0.0-0.5)   03/08/18  08:19    


 


Reactive Lymphs %  2.1 % (0.0-5.0)   03/08/18  08:19    


 


Reactive Lymphs #  0.1 # k/uL (0.0-0.8)   03/08/18  08:19    


 


pH  7.45  (7.35-7.45)   03/07/18  21:30    


 


pCO2  45 mmhg (35-48)   03/07/18  21:30    


 


pO2  57 mmhg ()  L  03/07/18  21:30    


 


HCO3  29.7 Meq/L (21-28)  H  03/07/18  21:30    


 


ABG O2 Sat Calc/Susan  92 % ()  L  03/07/18  21:30    


 


ABG Base Excess  6.4  (-2 - +2)  H  03/07/18  21:30    


 


Sodium  130 mmol/L (136-145)  L  03/08/18  08:19    


 


Potassium  3.9 mmol/L (3.5-5.1)   03/08/18  08:19    


 


Chloride  88 mmol/L ()  L  03/08/18  08:19    


 


Carbon Dioxide  30 mmol/L (22-30)   03/08/18  08:19    


 


BUN  21 mg/dL (7-17)  H  03/08/18  08:19    


 


Creatinine  0.90 mg/dL (0.52-1.04)   03/08/18  08:19    


 


Estimated Creat Clear  65   03/08/18  08:19    


 


Est GFR ( Amer)  > 60  (60-)  03/08/18  08:19    


 


Est GFR (Non-Af Amer)  > 60  (60-)  03/08/18  08:19    


 


Glucose  235 mg/dL ()  H  03/08/18  08:19    


 


Calcium  8.7 mg/dL (8.4-10.2)   03/08/18  08:19    


 


Total Bilirubin  0.8 mg/dL (0.2-1.3)   03/08/18  08:19    


 


AST  22 U/L (15-46)   03/08/18  08:19    


 


ALT  28 U/L (13-69)   03/08/18  08:19    


 


Alkaline Phosphatase  83 U/L ()   03/08/18  08:19    


 


Total Protein  5.8 g/dL (6.3-8.2)  L  03/08/18  08:19    


 


Albumin  3.4 g/dL (3.5-5.0)  L  03/08/18  08:19    

















Assessment/Plan





- Assessment/Plan


(1) COPD with acute exacerbation


Status: Acute   Current Visit: Yes   


Assessment: 


slightly improved








(2) Hyponatremia


Status: Acute   Current Visit: Yes   


Assessment: 


improved to 130








(3) Renal insufficiency, mild


Status: Acute   Current Visit: Yes   


Assessment: 


stable








(4) Coronary arteriosclerosis


Status: Chronic   Current Visit: No   


Assessment: 


stable








(5) Fall at home


Status: Acute   Current Visit: No   





(6) Diabetes mellitus type 2


Status: Chronic   Current Visit: No   


Assessment: 


stable

## 2018-03-11 VITALS — DIASTOLIC BLOOD PRESSURE: 67 MMHG | SYSTOLIC BLOOD PRESSURE: 159 MMHG

## 2018-03-11 LAB
BASOPHILS NFR BLD: 0.4 % (ref 0–1.5)
EGFR (AFRICAN): > 60
EGFR (NON-AFRICAN): > 60
EOSINOPHIL NFR BLD: 0.2 % (ref 0–6.8)
LABORATORY COMMENT REPORT: 0.91 THOU/UL (ref 0.6–4)
MCH.: 29.4 PG (ref 28–34)
MCV RBC: 92 FL (ref 80–100)
MONOCYTE ABS #: 0.25 THOU/UL (ref 0–0.9)
MONOCYTES NFR BLD: 3.3 % (ref 0–11)
PLATELET # BLD: 289 THOU/UL (ref 130–400)

## 2018-03-11 RX ADMIN — SODIUM CHLORIDE SCH UNITS: 9 INJECTION, SOLUTION INTRAVENOUS at 07:27

## 2018-03-11 RX ADMIN — CITALOPRAM SCH MG: 20 TABLET ORAL at 09:07

## 2018-03-11 RX ADMIN — MULTIVITAMIN TABLET SCH EACH: TABLET at 09:09

## 2018-03-11 RX ADMIN — GABAPENTIN SCH MG: 300 CAPSULE ORAL at 09:08

## 2018-03-11 RX ADMIN — LEVOTHYROXINE SODIUM SCH MCG: 50 TABLET ORAL at 06:24

## 2018-03-11 RX ADMIN — CYANOCOBALAMIN TAB 1000 MCG SCH MCG: 1000 TAB at 09:09

## 2018-03-11 RX ADMIN — BUDESONIDE SCH MG: 0.5 SUSPENSION RESPIRATORY (INHALATION) at 09:28

## 2018-03-11 RX ADMIN — SODIUM CHLORIDE SCH UNITS: 9 INJECTION, SOLUTION INTRAVENOUS at 11:32

## 2018-03-11 RX ADMIN — FLUDROCORTISONE ACETATE SCH MG: 0.1 TABLET ORAL at 09:08

## 2018-03-11 RX ADMIN — LISINOPRIL SCH MG: 20 TABLET ORAL at 09:08

## 2018-03-12 LAB
BASOPHILS NFR BLD: 0.6 % (ref 0–1.5)
EOSINOPHIL NFR BLD: 1.4 % (ref 0–6.8)
MCH RBC QN AUTO: 29.4 PG (ref 28–34)
MCV RBC AUTO: 89.8 FL (ref 80–100)
MONOCYTES %: 8.2 % (ref 0–11)
NEUTROPHILS #: 4.8 # K/UL (ref 1.4–7.7)

## 2018-03-17 ENCOUNTER — HOSPITAL ENCOUNTER (EMERGENCY)
Dept: HOSPITAL 44 - ED | Age: 83
Discharge: HOME | End: 2018-03-17
Payer: MEDICARE

## 2018-03-17 VITALS — SYSTOLIC BLOOD PRESSURE: 134 MMHG | DIASTOLIC BLOOD PRESSURE: 57 MMHG

## 2018-03-17 DIAGNOSIS — E87.1: ICD-10-CM

## 2018-03-17 DIAGNOSIS — J18.9: Primary | ICD-10-CM

## 2018-03-17 LAB
EGFR (AFRICAN): > 60
EGFR (NON-AFRICAN): > 60
MCH RBC QN AUTO: 29.1 PG (ref 28–34)
MCV RBC AUTO: 91.2 FL (ref 80–100)

## 2018-03-17 PROCEDURE — S1016 NON-PVC INTRAVENOUS ADMINIST: HCPCS

## 2018-03-17 PROCEDURE — 85025 COMPLETE CBC W/AUTO DIFF WBC: CPT

## 2018-03-17 PROCEDURE — 80053 COMPREHEN METABOLIC PANEL: CPT

## 2018-03-17 PROCEDURE — 99283 EMERGENCY DEPT VISIT LOW MDM: CPT

## 2018-03-17 PROCEDURE — 71046 X-RAY EXAM CHEST 2 VIEWS: CPT

## 2018-03-17 RX ADMIN — RETINOL, ERGOCALCIFEROL, .ALPHA.-TOCOPHEROL ACETATE, DL-, PHYTONADIONE, ASCORBIC ACID, NIACINAMIDE, RIBOFLAVIN 5-PHOSPHATE SODIUM, THIAMINE HYDROCHLORIDE, PYRIDOXINE HYDROCHLORIDE, DEXPANTHENOL, BIOTIN, FOLIC ACID, AND CYANOCOBALAMIN ONE MLS/HR: KIT at 10:50

## 2018-03-17 NOTE — DIAGNOSTIC IMAGING REPORT
TERRY SORTO 

[facility]

[id number] [address]

[place], [place]. [address]

 

 

 

 

Report Submission Date: Mar 17, 2018 11:37:35 AM CDT

Patient       Study

Name:   ANASTACIA RONDON       Date:   Mar 17, 2018 11:10:57 AM CDT

MRN:   [id number]       Modality Type:   DX

Gender:   F       Description:   CHEST

:   39       Institution:   [facility]

Physician:   TERRY SORTO

     Accession:    [id number]

 

 

Examination: PA and lateral chest. 



History: Evaluate lung fields. COUGH X 3 DAYS (Hx) 



Comparison exam: 2018 



Findings: PA lateral chest demonstrate a normal cardiac and mediastinal 
silhouette. Vascular calcifications involving the aortic arch. Stable chronic 
interstitial changes. Mild blunting/haziness of the posterior sulci. Left 
medial posterior lung granuloma. Bilateral shoulder replacement. Cervical 
fixation hardware. Neurostimulator wires. 



Impression: Chronic interstitial changes. Posterior sulci blunting suggesting 
infiltrate/effusion. Correlate with patient symptoms.

 

Electronically signed on Mar 17, 2018 11:37:35 AM CDT by:

Carlos RUST

## 2018-03-17 NOTE — ED PHYSICIAN DOCUMENTATION
General Adult





- HISTORIAN


Historian: patient





- HPI


Stated Complaint: cough and low sodium (per nursing home) 


Chief Complaint: Cough/ Upper Respiratory


Onset: days ago (3)


Timing: still present


Severity: mild


Further Comments: yes (She states she is not sure why she is here but thinks it 

is "low fluids" Dr Fitzgerald did call about pt prior to arrival and said her 

sodium was low at nursing home and she has had a cought that is concerning for 

staff at nursing home. She wears oxygen all the time at home. No fever. No N/V/D

)


Last known Well Code/Unknown Code: Unknown





- ROS


CONST: denies: fever, recent illness, weakness


EYES/ENT: denies: nasal drainage


CVS/RESP: cough ("I stay with a cough")


GI/: denies: vomiting, nausea, diarrhea


MS/SKIN/LYMPH: none.  denies: rash


NEURO/PSYCH: denies: headache, fainting, dizziness





- PAST HX


Past History: COPD, hypertension, other (DM type 2 )


Surgeries/Procedures: other (unknown )


Immunizations: UTD


Allergies/Adverse Reactions: 


 Allergies











Allergy/AdvReac Type Severity Reaction Status Date / Time


 


No Known Allergies Allergy   Verified 03/07/18 20:54














Home Medications: 


 Ambulatory Orders











 Medication  Instructions  Recorded


 


Multivitamin [Tab-A-Chandan] 1 each PO DAILY 01/24/15


 


Ranitidine HCl [Zantac] 150 mg PO D 07/26/15


 


Folic Acid [FA-8] 0.8 mg PO D 02/22/16


 


Gabapentin [Neurontin] 300 mg PO BID 08/23/17


 


Gabapentin [Neurontin] 600 mg PO HS 02/24/18


 


Acetaminophen [Tylenol] 650 mg PO Q6H PRN  tablet 03/11/18


 


Atorvastatin Calcium 40 mg PO HS #30 tablet 03/11/18


 


Bisacodyl [Dulcolax] 5 - 10 mg PO QD PRN #60 tablet. 03/11/18


 


Docusate Sodium [Colace] 100 mg PO DAILY #30 capsule 03/11/18


 


Ferrous Sulfate [Feosol] 325 mg PO D #30 tablet 03/11/18


 


Insulin Glargine,Hum.rec.anlog 5 unit SQ D #15 ml 03/11/18





[Lantus Solostar]  


 


Magnesium Oxide [Magnesium] 400 mg PO BID #60 tablet 03/11/18


 


Melatonin 5 mg PO PM #30 capsule 03/11/18


 


Metformin HCl [Glucophage] 500 mg PO AS DIRECTED #120 tablet 03/11/18


 


clonazePAM [Klonopin] 0.25 mg PO HS PRN #30 tablet 03/11/18


 


predniSONE [Deltasone] 10 mg PO DAILY #21 tablet 03/11/18














- SOCIAL HX


Smoking History: quit greater than 1 year


Alcohol Use: none


Drug Use: none





- FAMILY HX


Family History: No





- VITAL SIGNS


Vital Signs: 





 Vital Signs











Temp Pulse Resp BP Pulse Ox


 


          159/67    


 


          03/11/18 12:23   














- REVIEWED ASSESSMENTS


Nursing Assessment  Reviewed: Yes


Vitals Reviewed: Yes





ED Results Lab/Radiology





- Radiology


Radiology Impressions: 





Examination: PA and lateral chest. 





History: Evaluate lung fields. COUGH X 3 DAYS (Hx) 





Comparison exam: 7 March 2018 





Findings: PA lateral chest demonstrate a normal cardiac and mediastinal 

silhouette. Vascular calcifications involving the aortic arch. Stable chronic 

interstitial changes. Mild blunting/haziness of the posterior sulci. Left 

medial posterior lung granuloma. Bilateral shoulder replacement. Cervical 

fixation hardware. Neurostimulator wires. 





Impression: Chronic interstitial changes. Posterior sulci blunting suggesting 

infiltrate/effusion. Correlate with patient symptoms. 


Electronically signed on Mar 17, 2018 11:37:35 AM CDT by: 


Carlos Mckeon





General Adult Physical Exam





- PHYSICAL EXAM


GENERAL APPEARANCE: no distress


EENT: eye inspection normal


NECK: normal inspection


RESPIRATORY: no resp distress, wheezes


CVS: reg rate & rhythm, heart sounds normal, equal pulses, no murmur


ABDOMEN: soft, normal bowel sounds, no distension, non-tender


SKIN: warm/dry, normal color


EXTREMITIES: non-tender, normal range of motion, no evidence of injury, no edema


NEURO: oriented X3, CN's nml as tested, motor nml, sensation nml, mood/affect 

nml, cognition normal





Discharge


Clincal Impression: 


Pneumonia


Qualifiers:


 Pneumonia type: due to unspecified organism Laterality: unspecified laterality 

Lung location: unspecified part of lung Qualified Code(s): J18.9 - Pneumonia, 

unspecified organism





Referrals: 


Johnnie Garcia MD [Primary Care Provider] - 2 Days


Additional Instructions: 


1. Levaquin 500 mg daily 


2. Duoneb breathing treatments QID daily 


3. Continue prednisone


4. Oxygen at home as ordered


5. Re check CBC in 2 days


6. Follow up with PCP in 2-4 days


7. Return to ER for fever, increased cough, increased shortness of air or other 

concerns 


Condition: Stable


Disposition: 01 HOME, SELF-CARE


Decision to Admit: NO


Date of Decison to Admit: 03/17/18


Decision Time: 11:48

## 2018-03-18 LAB
MONOCYTES %: 1 % (ref 0–11)
SEGMENTED NEUTROPHILS %: 91 % (ref 39–79)

## 2018-03-27 NOTE — DISCHARGE SUMMARY
Discharge Summary





- Discharge Sumary


History of Present Illness: 


82-year-old white female with a history of COPD. Patient does having some 

increasing shortness of breath dyspnea. Patient is on home oxygen at bedtime. 

Patient does have a history of sleep apnea. 24 hours prior to admission patient 

does having some increasing shortness of breath dyspnea on exertion. Patient 

stated that she had a slight productive cough up some clear to slightly yellow 

phlegm. No fever or chills noted. Patient was brought to the emergency room was 

felt to be having a mild exacerbation of her COPD and was admitted to 

observation care. On the morning following admission patient continues to have 

significant expiratory wheezing and needed supplemental oxygen in order to 

maintain her SaO2 greater than 92%. Patient was subsequently admitted to acute 

care for further aggressive treatment of her COPD exacerbation with IV steroids 

and high flow nebulization treatments.





Condition at Discharge: Stable


Home Medications: 


 Ambulatory Orders











 Medication  Instructions  Recorded


 


Multivitamin [Tab-A-Chandan] 1 each PO DAILY 01/24/15


 


Ranitidine HCl [Zantac] 150 mg PO D 07/26/15


 


Folic Acid [FA-8] 0.8 mg PO D 02/22/16


 


Gabapentin [Neurontin] 300 mg PO BID 08/23/17


 


Gabapentin [Neurontin] 600 mg PO HS 02/24/18


 


Acetaminophen [Tylenol] 650 mg PO Q6H PRN  tablet 03/11/18


 


Atorvastatin Calcium 40 mg PO HS #30 tablet 03/11/18


 


Bisacodyl [Dulcolax] 5 - 10 mg PO QD PRN #60 tablet. 03/11/18


 


Docusate Sodium [Colace] 100 mg PO DAILY #30 capsule 03/11/18


 


Ferrous Sulfate [Feosol] 325 mg PO D #30 tablet 03/11/18


 


Magnesium Oxide [Magnesium] 400 mg PO BID #60 tablet 03/11/18


 


Melatonin 5 mg PO PM #30 capsule 03/11/18


 


Metformin HCl [Glucophage] 500 mg PO AS DIRECTED #120 tablet 03/11/18


 


clonazePAM [Klonopin] 0.25 mg PO HS PRN #30 tablet 03/11/18


 


predniSONE [Deltasone] 10 mg PO DAILY #21 tablet 03/11/18











Consultations this Visit: None


Procedures this Visit: None


Allergies/Adverse Reactions: 


 Allergies











Allergy/AdvReac Type Severity Reaction Status Date / Time


 


No Known Allergies Allergy   Verified 03/07/18 20:54











Discharge Summary: 


Was started on supplemental oxygen in order to maintain SaO2 greater than 92%. 

Oxygen was able to be weaned down to 2 L per nasal cannula at the time to 

discharge. Patient was started on DuoNeb treatment and IV steroids. Patient 

breathing status did improve during the course of hospitalization. At the time 

to discharge patient required supplemental oxygen in order to maintain her SaO2 

greater than 92%. Patient was moving here better on admission. Patient 

hyponatremia did improve during her hospitalization with IV fluids of normal 

saline. Patient did have some mild hypokalemia that was corrected during her 

hospitalization. Patient diabetes mellitus remain fairly stable blood sugars 

were elevated in the 250 range. Patient was started on insulin therapy. Patient 

chronic kidney disease remains stable to improved.








- Final Diagnosis


(1) COPD with acute exacerbation


Problems: 


improved at discharge, still requires supplemental oxygen








(2) Hyponatremia


Problems: 


improved








(3) Renal insufficiency, mild


Problems: 


stable








(4) Coronary arteriosclerosis


Problems: 


stable on home medications








(5) Fall at home


Problems: 


stable








(6) Diabetes mellitus type 2


Problems: 


Patient started on insulin

## 2018-04-12 ENCOUNTER — HOSPITAL ENCOUNTER (OUTPATIENT)
Dept: HOSPITAL 44 - SOUTH | Age: 83
Setting detail: OBSERVATION
LOS: 1 days | Discharge: HOME | End: 2018-04-13
Attending: FAMILY MEDICINE | Admitting: FAMILY MEDICINE
Payer: MEDICARE

## 2018-04-12 VITALS — BODY MASS INDEX: 26.1 KG/M2

## 2018-04-12 DIAGNOSIS — E11.9: ICD-10-CM

## 2018-04-12 DIAGNOSIS — A08.4: Primary | ICD-10-CM

## 2018-04-12 DIAGNOSIS — I10: ICD-10-CM

## 2018-04-12 DIAGNOSIS — E87.1: ICD-10-CM

## 2018-04-12 DIAGNOSIS — I25.10: ICD-10-CM

## 2018-04-12 DIAGNOSIS — J44.9: ICD-10-CM

## 2018-04-12 LAB
BASOPHILS NFR BLD: 0.3 % (ref 0–1.5)
EGFR (AFRICAN): > 60
EGFR (NON-AFRICAN): > 60
EOSINOPHIL NFR BLD: 3 % (ref 0–6.8)
MCH RBC QN AUTO: 28.9 PG (ref 28–34)
MCV RBC AUTO: 88.1 FL (ref 80–100)
MONOCYTES %: 4.7 % (ref 0–11)
NEUTROPHILS #: 4.8 # K/UL (ref 1.4–7.7)

## 2018-04-12 PROCEDURE — 85025 COMPLETE CBC W/AUTO DIFF WBC: CPT

## 2018-04-12 PROCEDURE — G0379 DIRECT REFER HOSPITAL OBSERV: HCPCS

## 2018-04-12 PROCEDURE — G0378 HOSPITAL OBSERVATION PER HR: HCPCS

## 2018-04-12 PROCEDURE — 80053 COMPREHEN METABOLIC PANEL: CPT

## 2018-04-12 PROCEDURE — 99217: CPT

## 2018-04-12 PROCEDURE — 99219: CPT

## 2018-04-12 PROCEDURE — S1016 NON-PVC INTRAVENOUS ADMINIST: HCPCS

## 2018-04-12 PROCEDURE — 81002 URINALYSIS NONAUTO W/O SCOPE: CPT

## 2018-04-12 RX ADMIN — SODIUM CHLORIDE SCH: 9 INJECTION, SOLUTION INTRAVENOUS at 20:31

## 2018-04-12 RX ADMIN — BUDESONIDE SCH MG: 0.5 SUSPENSION RESPIRATORY (INHALATION) at 20:40

## 2018-04-12 RX ADMIN — SODIUM CHLORIDE, PRESERVATIVE FREE SCH ML: 5 INJECTION INTRAVENOUS at 21:48

## 2018-04-12 RX ADMIN — ATORVASTATIN CALCIUM SCH MG: 80 TABLET, FILM COATED ORAL at 13:55

## 2018-04-12 RX ADMIN — ATORVASTATIN CALCIUM SCH: 80 TABLET, FILM COATED ORAL at 20:47

## 2018-04-12 RX ADMIN — Medication SCH: at 21:47

## 2018-04-12 RX ADMIN — GABAPENTIN SCH MG: 300 CAPSULE ORAL at 20:34

## 2018-04-12 RX ADMIN — ENOXAPARIN SODIUM SCH MG: 30 INJECTION SUBCUTANEOUS at 13:55

## 2018-04-12 RX ADMIN — Medication SCH MG: at 13:55

## 2018-04-12 RX ADMIN — SODIUM CHLORIDE SCH UNITS: 9 INJECTION, SOLUTION INTRAVENOUS at 14:06

## 2018-04-12 RX ADMIN — MAGNESIUM OXIDE TAB 400 MG (241.3 MG ELEMENTAL MG) SCH MG: 400 (241.3 MG) TAB at 20:34

## 2018-04-12 RX ADMIN — SODIUM CHLORIDE SCH: 9 INJECTION, SOLUTION INTRAVENOUS at 16:37

## 2018-04-12 NOTE — HISTORY AND PHYSICAL REPORT
History of Present Illnes





- History of Present Illness


Reason for Visit: nausea vomiting


History of Present Illness: 





82-year-old white female who stated approximately three days ago she developed 

a diarrhea. Patient denies any medication or melena. Patient stated that seem 

to have improved but now she is having some nausea associated with vomiting. 

Patient is not been able to keep anything down for the last 24 hours. Patient 

is diabetic. Patient blood sugars have been stable. Patient does have a history 

of hyponatremia. It was felt that the patient with need to be admitted to 

hospital given some IV Zofran and to monitor her blood sugars.





- Past Medical History


Cardiac: CAD, HTN, Valve insufficiency


Pulmonary: COPD


CNS: Peripheral neuropathy


Gastrointestinal: Other (history of pancreatitis)


Heme/Onc: Iron deficiency anemia


Psych: Depression


Renal/: Other (chronic hyponatremia)


Endocrine: Diabetes (type 2)





- Past Surgical History


Past Surgical History: Appendectomy, Hysterectomy (total), Total Hip Replacement

, Tonsillectomy, Other (AP Bladder suspension, PCI with stenting, nerve 

stimulator implantation, total shoulder replacement)





- Past Social History


Smoke: <1 pack per day


Occupation: retired


Alcohol: None


Drugs: None


Lives: Alone


Domestic Violence: Negative





- Health Maintenance


Health Maintenance: Cholesterol, Influenza Vaccine, Pneumococcal Vaccine, 

Mammogram


Pneumonia Vaccine: Yes


Resuscitation Status: 


Resusciation Status





Resuscitation Status             Do Not Resuscitate














- Unable to Obtain History


Unable to Obtain: No





Review of Systems





- Review of Systems


Constitutional: Weakness.  negative: Fever, Chills


Eyes: negative: vision change


ENT: negative: Ear Pain, Ear Discharge, Nose Congestion, Mouth Pain, Mouth 

Swelling, Throat Pain, Throat Swelling


Respiratory: Cough, Dry


Cardiovascular: negative: Chest Pain, Palpitations, Paroxysmal Noc. Dyspnea


Gastrointestinal: Nausea, Vomiting (this AM), Abdominal Pain, Diarrhea (last 

was 2 days ago).  negative: Constipation, Melena, Hematochezia


Genitourinary: negative: Dysuria, Frequency, Incontinence, Hematuria


Musculoskeletal: Neck Pain, Back Pain, Hand Pain.  negative: Shoulder Pain, Arm 

Pain


Skin: negative: Rash


Neurological: Weakness.  negative: Numbness, Incoordination, Change in Speech, 

Confusion





- Medications/Allergies


Allergies/Adverse Reactions: 


 Allergies











Allergy/AdvReac Type Severity Reaction Status Date / Time


 


No Known Allergies Allergy   Verified 03/07/18 20:54














Current Inpatient Medications: 


 Current Inpatient Medications





Acetaminophen (Tylenol)  650 mg PO Q6H PRN


   PRN Reason: Fever >101


Albuterol/Ipratropium (Duoneb)  3 ml NEB Q4 PRN


   PRN Reason: dyspnea


Atorvastatin Calcium (Lipitor)  40 mg PO HS [facility]


Bisacodyl (Dulcolax)  5 - 10 mg PO QD PRN


   PRN Reason: Constipation


Budesonide (Pulmicort)  0.5 mg NEB BID [facility]


Citalopram Hydrobromide (Celexa)  10 mg PO DAILY [facility]


Clonazepam (Klonopin)  0.25 mg PO HS PRN


   PRN Reason: restless leg syndrom3


Docusate Sodium (Colace)  100 mg PO D PRN


   PRN Reason: Constipation


Enoxaparin Sodium (Lovenox)  30 mg SQ QD [facility]


   Stop: 04/25/18 13:01


Fludrocortisone Acetate (Florinef)  0.1 mg PO QDAY [facility]


Folic Acid (Folvite)  1 mg PO DAILY [facility]


Gabapentin (Neurontin)  300 mg PO BID [facility]


Insulin Human Regular (Humulin R)  0 - 12 unit SQ CHEMQID [facility]


   PRN Reason: Protocol


Lisinopril (Prinivil)  40 mg PO D [facility]


Magnesium Oxide (Mag-Oxide)  400 mg PO BID [facility]


Melatonin (Melatonin)  3 mg PO HS [facility]


Metformin HCl (Glucophage)  500 mg PO AS DIRECTED [facility]


Miscellaneous (Patient Own Med)  1 each INH D [facility]


Miscellaneous (Patient Own Med)  1 each PO BID [facility]


Miscellaneous (Chem Sticks)  1 each MC CHEMQID [facility]


Ondansetron HCl (Zofran 4 Mg/2 Ml)  4 mg IVP Q6 PRN


   PRN Reason: Nausea / Vomiting


Sodium Chloride (Normal Saline Flush)  3 ml IV BID [facility]














Exam





- Exam


General: Alert, Oriented to Person, Oriented to Place, Oriented to Time, 

Cooperative, Mild distress


HEENT: Atraumatic, PERRLA, EOMI, Nose Mucous membr. moist/Pink, Poor Dentition (

multiple teeth missing), Hearing Grossly Normal, Other.  No: Mouth Mucous 

membr. moist/Pink (dry mucosla membranes), Pharyngeal Erythema, Tonsillar 

Exudate


Neck: Normal Range of Motion


Lungs: Clear to auscultation, Normal air movement, Speaks full Sentences, 

Wheezes (occasional expiratory).  No: Rales, Rhonchi


Cardiovascular: Regular rate, Normal S1, Normal S2, No murmurs


Abdomen: Normal bowel sounds, Soft, No tenderness, No hepatospenomegaly, No 

masses


Integumentary: Normal, Pink, Warm, Dry


Extremities: No clubbing, No cyanosis, No edema, Normal pulses, No tenderness/

swelling


Neurological: Normal gait, Normal speech, Strength Equal Bilat, Normal tone, 

Sensation intact, Cranial nerves 3-12 NL, Reflexes 2+


Psych/Mental Status: Mental status NL, Mood NL, Appropriate Affect, Intact 

Judgment





- Laboratory Results


Laboratory Results: 


 Laboratory Results











  04/12/18 04/12/18





  12:45 12:45


 


WBC  6.10 


 


RBC  4.15 


 


Hgb  12.0 


 


Hct  36.6 


 


MCV  88.1 


 


MCH  28.9 


 


MCHC  32.8 


 


RDW  14.0 


 


Plt Count  283 


 


Neut % (Auto)  77.8 


 


Lymph % (Auto)  13.2 L 


 


Mono % (Auto)  4.7 


 


Eos % (Auto)  3.0 


 


Baso % (Auto)  0.3 


 


Neut # (Auto)  4.8 


 


Lymph # (Auto)  0.8 


 


Mono # (Auto)  0.3 


 


Eos # (Auto)  0.2 


 


Baso # (Auto)  0.0 


 


Reactive Lymphs %  0.9 


 


Reactive Lymphs #  0.1 


 


Sodium   134 L


 


Potassium   4.0


 


Chloride   91 L


 


Carbon Dioxide   29


 


BUN   16


 


Creatinine   0.90


 


Est GFR ( Amer)   > 60


 


Est GFR (Non-Af Amer)   > 60


 


Glucose   151 H


 


Calcium   9.0


 


Total Bilirubin   0.8


 


AST   16


 


ALT   26


 


Alkaline Phosphatase   103


 


Total Protein   7.5


 


Albumin   4.0

















Assessment/Plan





- Assessment/Plan


(1) Viral gastroenteritis


Status: Acute   Current Visit: Yes   


Assessment: 


Patient will be started on clear liquid diet. Patient will be given Zofran to 

help with the nausea and vomiting. Well get blood work done that the patient 

needs supplemental IV fluids at this time.








(2) Hyponatremia


Status: Chronic   Current Visit: No   


Assessment: 


Will get a CMP to see with the patient sodium level is. May need to supplement 

sodium with IV fluids.








(3) Chronic obstructive pulmonary disease


Status: Chronic   Current Visit: No   


Assessment: 


This to be stable at this time. Will continue with home medications.








(4) Diabetes mellitus type 2


Status: Chronic   Current Visit: No   


Assessment: 


Patient will be continued on home medications. Was that the patient sliding 

scale insulin. Will monitor blood sugars closely.








VTE Assessment





- RISK FACTOR SCORE


VTE RISK FACTOR SCORES: AGE OVER 60 YEARS

## 2018-04-13 VITALS
SYSTOLIC BLOOD PRESSURE: 195 MMHG | DIASTOLIC BLOOD PRESSURE: 78 MMHG | SYSTOLIC BLOOD PRESSURE: 195 MMHG | DIASTOLIC BLOOD PRESSURE: 78 MMHG

## 2018-04-13 LAB
PH UR STRIP: 7.5 [PH] (ref 5–8)
UROBILINOGEN URINE: 0.2 EU (ref 0.2–1)

## 2018-04-13 RX ADMIN — BUDESONIDE SCH MG: 0.5 SUSPENSION RESPIRATORY (INHALATION) at 09:55

## 2018-04-13 RX ADMIN — SODIUM CHLORIDE SCH: 9 INJECTION, SOLUTION INTRAVENOUS at 07:21

## 2018-04-13 RX ADMIN — SODIUM CHLORIDE SCH UNITS: 9 INJECTION, SOLUTION INTRAVENOUS at 12:32

## 2018-04-13 RX ADMIN — ENOXAPARIN SODIUM SCH: 30 INJECTION SUBCUTANEOUS at 12:38

## 2018-04-13 RX ADMIN — SODIUM CHLORIDE, PRESERVATIVE FREE SCH ML: 5 INJECTION INTRAVENOUS at 09:40

## 2018-04-13 RX ADMIN — GABAPENTIN SCH MG: 300 CAPSULE ORAL at 09:35

## 2018-04-13 RX ADMIN — MAGNESIUM OXIDE TAB 400 MG (241.3 MG ELEMENTAL MG) SCH MG: 400 (241.3 MG) TAB at 09:35

## 2018-04-13 NOTE — DISCHARGE SUMMARY
Discharge Summary





- Discharge Sumary


History of Present Illness: 





82-year-old white female who approximately three days prior to admission 

developed some diarrhea. This did improve. 24 hours prior to admission over 

patient does have some nausea and vomiting. Patient was not able to keep any 

oral medications or fluids down. Patient was seen in the clinic. There was 

concern that the patient has a history of diabetes mellitus and hyponatremia 

with these chronic medical conditions with deteriorate with her nausea and 

vomiting. Patient was subsequently admitted to the hospital to be monitored and 

for IV Zofran.


Condition at Discharge: Stable


Home Medications: 


 Ambulatory Orders











 Medication  Instructions  Recorded


 


Multivitamin [Tab-A-Chandan] 1 each PO DAILY 01/24/15


 


Ranitidine HCl [Zantac] 150 mg PO D 07/26/15


 


Folic Acid [FA-8] 0.8 mg PO D 02/22/16


 


Gabapentin [Neurontin] 300 mg PO BID 08/23/17


 


Gabapentin [Neurontin] 600 mg PO HS 02/24/18


 


Acetaminophen [Tylenol] 650 mg PO Q6H PRN  tablet 03/11/18


 


Atorvastatin Calcium 40 mg PO HS #30 tablet 03/11/18


 


Bisacodyl [Dulcolax] 5 - 10 mg PO QD PRN #60 tablet. 03/11/18


 


Docusate Sodium [Colace] 100 mg PO DAILY #30 capsule 03/11/18


 


Ferrous Sulfate [Feosol] 325 mg PO D #30 tablet 03/11/18


 


Magnesium Oxide [Magnesium] 400 mg PO BID #60 tablet 03/11/18


 


Melatonin 5 mg PO PM #30 capsule 03/11/18


 


Metformin HCl [Glucophage] 500 mg PO AS DIRECTED #120 tablet 03/11/18


 


clonazePAM [Klonopin] 0.25 mg PO HS PRN #30 tablet 03/11/18











Consultations this Visit: None


Procedures this Visit: None


Allergies/Adverse Reactions: 


 Allergies











Allergy/AdvReac Type Severity Reaction Status Date / Time


 


No Known Allergies Allergy   Verified 03/07/18 20:54











Patient Problems: 


 Current Active Problems











Problem Status Onset


 


Viral gastroenteritis Acute  











Discharge Summary: 





Patient was admitted to the hospital after she developed nausea and vomiting. 

Several of her other family members at home had similar symptoms. Patient is 

diabetic and was not able to keep her medications down. Patient was started on 

IV Zofran. Blood work was drawn in show that the patient electrolyte were 

stable along with her creatinine and BUN. But the Zofran patient was able to 

tolerate oral fluids. Patient blood sugars were monitored closely. At the time 

to discharge patient was taking an oral fluids without much trouble. Patient 

was subsequently discharged home in stable condition.





- Final Diagnosis


(1) Viral gastroenteritis


Problems: 


improved








(2) Hyponatremia


Problems: 


stable








(3) Chronic obstructive pulmonary disease


Problems: 


stable








(4) Diabetes mellitus type 2


Problems: 


stable

## 2018-05-11 ENCOUNTER — HOSPITAL ENCOUNTER (OUTPATIENT)
Dept: HOSPITAL 44 - PULMONARY | Age: 83
End: 2018-05-11
Attending: INTERNAL MEDICINE
Payer: MEDICARE

## 2018-05-11 DIAGNOSIS — G25.81: ICD-10-CM

## 2018-05-11 DIAGNOSIS — G45.9: ICD-10-CM

## 2018-05-11 DIAGNOSIS — G62.9: ICD-10-CM

## 2018-05-11 DIAGNOSIS — Z99.81: ICD-10-CM

## 2018-05-11 DIAGNOSIS — G47.33: ICD-10-CM

## 2018-05-11 DIAGNOSIS — I25.10: ICD-10-CM

## 2018-05-11 DIAGNOSIS — I10: ICD-10-CM

## 2018-05-11 DIAGNOSIS — D64.9: ICD-10-CM

## 2018-05-11 DIAGNOSIS — Z72.0: ICD-10-CM

## 2018-05-11 DIAGNOSIS — J44.9: Primary | ICD-10-CM

## 2018-05-11 DIAGNOSIS — E11.9: ICD-10-CM

## 2018-05-11 DIAGNOSIS — E78.5: ICD-10-CM

## 2018-05-11 DIAGNOSIS — E87.1: ICD-10-CM

## 2018-05-11 PROCEDURE — 99214 OFFICE O/P EST MOD 30 MIN: CPT
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
## 2018-09-15 ENCOUNTER — HOSPITAL ENCOUNTER (EMERGENCY)
Dept: HOSPITAL 44 - ED | Age: 83
Discharge: NURSING, REHAB OR LONG-TERM CARE | End: 2018-09-15
Payer: MEDICARE

## 2018-09-15 VITALS — DIASTOLIC BLOOD PRESSURE: 42 MMHG | SYSTOLIC BLOOD PRESSURE: 104 MMHG

## 2018-09-15 DIAGNOSIS — R41.0: Primary | ICD-10-CM

## 2018-09-15 LAB
BASOPHILS NFR BLD: 0.8 % (ref 0–1.5)
EGFR (NON-AFRICAN): > 60
EOSINOPHIL NFR BLD: 3.3 % (ref 0–6.8)
LABORATORY COMMENT REPORT: 1.78 THOU/UL (ref 0.6–4)
MCH.: 29.7 PG (ref 28–34)
MCV RBC: 87 FL (ref 80–100)
MONOCYTE ABS #: 0.34 THOU/UL (ref 0–0.9)
MONOCYTES NFR BLD: 5.9 % (ref 0–11)
PLATELET # BLD: 329 THOU/UL (ref 130–400)

## 2018-09-15 PROCEDURE — 84484 ASSAY OF TROPONIN QUANT: CPT

## 2018-09-15 PROCEDURE — 70450 CT HEAD/BRAIN W/O DYE: CPT

## 2018-09-15 PROCEDURE — 85610 PROTHROMBIN TIME: CPT

## 2018-09-15 PROCEDURE — 99285 EMERGENCY DEPT VISIT HI MDM: CPT

## 2018-09-15 PROCEDURE — 93005 ELECTROCARDIOGRAM TRACING: CPT

## 2018-09-15 PROCEDURE — 71045 X-RAY EXAM CHEST 1 VIEW: CPT

## 2018-09-15 PROCEDURE — 85730 THROMBOPLASTIN TIME PARTIAL: CPT

## 2018-09-15 PROCEDURE — 83880 ASSAY OF NATRIURETIC PEPTIDE: CPT

## 2018-09-15 PROCEDURE — 80053 COMPREHEN METABOLIC PANEL: CPT

## 2018-09-15 PROCEDURE — 81002 URINALYSIS NONAUTO W/O SCOPE: CPT

## 2018-09-15 PROCEDURE — S1016 NON-PVC INTRAVENOUS ADMINIST: HCPCS

## 2018-09-15 PROCEDURE — 85025 COMPLETE CBC W/AUTO DIFF WBC: CPT

## 2018-09-15 PROCEDURE — 96365 THER/PROPH/DIAG IV INF INIT: CPT

## 2018-09-15 PROCEDURE — 82550 ASSAY OF CK (CPK): CPT

## 2018-09-15 NOTE — DIAGNOSTIC IMAGING REPORT
JESSICA BARCLAY 

[facility]

[address] [facility] [address] [place] [address]. [address]

 

 

 

 

Report Submission Date: Sep 15, 2018 3:23:16 PM CDT

Patient       Study

Name:   ANASTACIA RONDON       Date:   Sep 15, 2018 2:46:12 PM CDT

MRN:   [id number]       Modality Type:   CT

Gender:   F       Description:   CT BRAIN W/O CONTRAST

:   39       Institution:   [facility]

Physician:   JESSICA BARCLAY

     Accession:    [id number]

 

 

CT brain noncontrast 



Date of study: September 15, 2018 



CLINICAL HISTORY:  CT HEAD W/O. CHANGE IN MENTAL STATUS TODAY (Hx) / ITS.REASON 
change in ms 

-------------------------------------------------- 

Note time : 9/15/2018 4:02:45 PM 

User : Natalie Rodriguez 



CT HEAD W/O. CHANGE IN MENTAL STATUS TODAY 

-------------------------------------------------- (DICOM Hx) / ITS.REASON 
fever, change in ms, low SpO2 (Pt comments) 





TECHNIQUE: 

5 mm contiguous axial images of the brain, noncontrast. 



FINDINGS: 

There is no evidence of intracranial mass effect, hemorrhage, or acute 
hydrocephalus. The lateral ventricles are enlarged and the 4th ventricle is 
midline without shift. No acute brain parenchymal changes or extra-axial fluid 
collections are identified. There is cerebral atrophy and white matter gliosis. 
Cerebellar atrophy is present. There is  intracranial atherosclerotic vascular 
calcification. 



The calvarium is intact. The visualized sinuses and mastoid air cells are clear.




IMPRESSION: 

No acute intracranial process.. Chronic atrophy and white matter gliosis

 

Electronically signed on Sep 15, 2018 3:23:16 PM CDT by:

Nile Nunez

[facility]

## 2018-09-15 NOTE — ED PHYSICIAN DOCUMENTATION
General Adult





- HISTORIAN


Historian: patient





- HPI


Stated Complaint: change in ms


Chief Complaint: General Adult


Onset: hours


Timing: still present


Further Comments: yes (Pt is an 82 yo female nursing home resident with change 

in mental status.  Pt was out with her daughter, returning home in a car, and 

was unable to find the water bottle that she keeps in the door.  When daughter 

got the waterbottle for her, she lifted it toward her mouth and then seemed not 

to know what to do with it.  Daughter asked her what year it was, and the pt 

answered, "[birth year]."  Pt has had TIA's in the past and she has an extensive PMHx 

including COPD on home O2 prn, CAD, HTN, DM2, peripheral neuropathy, chronic 

hyponatermia, iron deficiency anemia, HLD, JULIAN, restless leg syndrome.  Pt has a

stimulator in her back for pain control.)





- ROS


CONST: no problems


EYES/ENT: none


CVS/RESP: shortness of breath (chronic)


GI/: none


MS/SKIN/LYMPH: none


NEURO/PSYCH: other (confusion)





- PAST HX


Past History: other (COPD on home O2 prn, CAD, HTN, DM2, peripheral neuropathy, 

chronic hyponatermia, iron deficiency anemia, HLD, JULIAN, restless leg syndrome, 

stimulator in back for pain control.  )


Allergies/Adverse Reactions: 


                                    Allergies











Allergy/AdvReac Type Severity Reaction Status Date / Time


 


No Known Allergies Allergy   Verified 09/15/18 14:40














Home Medications: 


                                Ambulatory Orders











 Medication  Instructions  Recorded


 


Multivitamin [Tab-A-Chandan] 1 each PO DAILY 01/24/15


 


Ranitidine HCl [Zantac] 150 mg PO D 07/26/15


 


Folic Acid [FA-8] 0.8 mg PO D 02/22/16


 


Gabapentin [Neurontin] 300 mg PO BID 08/23/17


 


Acetaminophen [Tylenol] 650 mg PO Q6H PRN  tablet 03/11/18


 


Atorvastatin Calcium 40 mg PO HS #30 tablet 03/11/18


 


Bisacodyl [Dulcolax] 5 - 10 mg PO QD PRN #60 tablet. 03/11/18


 


Docusate Sodium [Colace] 100 mg PO DAILY #30 capsule 03/11/18


 


Ferrous Sulfate [Feosol] 325 mg PO D #30 tablet 03/11/18


 


Magnesium Oxide [Magnesium] 400 mg PO BID #60 tablet 03/11/18


 


Melatonin 5 mg PO PM #30 capsule 03/11/18


 


Metformin HCl [Glucophage] 500 mg PO AS DIRECTED #120 tablet 03/11/18














- SOCIAL HX


Smoking History: other (recently quit smoking)





- FAMILY HX


Family History: Yes (Father: cancer, lung dz, heart dz;  Sister: cancer, heart 

dz)





- VITAL SIGNS


Vital Signs: 





                                   Vital Signs











Temp Pulse Resp BP Pulse Ox


 


          195/78    


 


          04/13/18 10:00   














- REVIEWED ASSESSMENTS


Nursing Assessment  Reviewed: Yes


Vitals Reviewed: Yes





Progress





- Progress


Progress: 





Accucheck 223, SpO2 = 87% on presentation





(Pt took 8 units regular insulin 1 hr pta)





 cc IVF in ER











CXR: The  lung fields are less well ventilated. There is cardiomegaly. Bilateral

 shoulder arthroplasty cervical fusion an epidural catheters are again noted. 

Cardiomegaly is present. No acute infiltrate is seen. 


Impression:  No acute pulmonary disease. Decreased lung field ventilation.








CT brain w/o contrast: 


There is no evidence of intracranial mass effect, hemorrhage, or acute 

hydrocephalus. The lateral ventricles are enlarged and the 4th ventricle is 

midline without shift. No acute brain parenchymal changes or extra-axial fluid 

collections are identified. There is cerebral atrophy and white matter gliosis. 

Cerebellar atrophy is present. There is  intracranial atherosclerotic vascular 

calcification.   The calvarium is intact. The visualized sinuses and mastoid air

 cells are clear. 


IMPRESSION:  No acute intracranial process.. Chronic atrophy and white matter 

gliosis.





pt improved

















Follow up with Neurologist in am to consider possible need for carotid Doppler 

studies.





Return to ER if symptoms worsen or you have concerns.  





Follow up with Cardiologist as planned.





- EKG/XRAY/CT


EKG: NSR (HR=89; normal WV interval; normal axis; non-specific ST/T-wave 

changes.)





General Adult Physical Exam





- PHYSICAL EXAM


GENERAL APPEARANCE: mild distress


EENT: dry mucous membranes


NECK: normal inspection, supple


RESPIRATORY: no resp distress, chest non-tender, breath sounds normal


CVS: reg rate & rhythm, heart sounds normal


ABDOMEN: soft, no organomegaly, normal bowel sounds


BACK: normal inspection, no CVA tenderness


SKIN: warm/dry, normal color


EXTREMITIES: non-tender, normal range of motion


NEURO: CN's nml as tested, motor nml, sensation nml, other (initially not 

oriented to correct time and place; improved in course of visit.)





Discharge


Clincal Impression: 


 Probable TIA, Mental confusion





Referrals: 


Johnnie Garcia MD [Primary Care Provider] - 


Condition: Stable


Disposition: [facility]


Decision to Admit: NO


Decision Time: 16:48

## 2018-09-15 NOTE — DIAGNOSTIC IMAGING REPORT
JESSICA BARCLAY 

[facility] [place] [facility]

[address] [facility] [address] [place] [address]. [address]

 

 

 

 

Report Submission Date: Sep 15, 2018 3:21:49 PM CDT

Patient       Study

Name:   ANASTACIA RONDON       Date:   Sep 15, 2018 2:47:58 PM CDT

MRN:   [id number]       Modality Type:   DX

Gender:   F       Description:   CHEST

:   39       Institution:   [facility] [place] [facility]

Physician:   JESSICA BARCLAY

     Accession:    [id number]

 

 

Ap portable upright radiographs of the chest 

Clinical history: Fever altered mental status 

Comparison 2018 





Technique: anterior /posterior portable upright 





Findings the  lung fields are less well ventilated. There is cardiomegaly. 
Bilateral shoulder arthroplasty cervical fusion an epidural catheters are again 
noted. Cardiomegaly is present. No acute infiltrate is seen. 





Impression: No acute pulmonary disease 

Decreased lung field ventilation

 

Electronically signed on Sep 15, 2018 3:21:49 PM CDT by:

Nile RUST

## 2018-10-29 ENCOUNTER — HOSPITAL ENCOUNTER (EMERGENCY)
Dept: HOSPITAL 44 - ED | Age: 83
Discharge: HOME | End: 2018-10-29
Payer: MEDICARE

## 2018-10-29 VITALS — DIASTOLIC BLOOD PRESSURE: 32 MMHG | SYSTOLIC BLOOD PRESSURE: 110 MMHG

## 2018-10-29 DIAGNOSIS — I50.9: Primary | ICD-10-CM

## 2018-10-29 LAB
BASOPHILS NFR BLD: 0.2 % (ref 0–1.5)
EGFR (NON-AFRICAN): > 60
EOSINOPHIL NFR BLD: 0.8 % (ref 0–6.8)
MCH RBC QN AUTO: 29.1 PG (ref 28–34)
MCV RBC AUTO: 87 FL (ref 80–100)
MONOCYTES %: 6.4 % (ref 0–11)
NEUTROPHILS #: 3.4 # K/UL (ref 1.4–7.7)

## 2018-10-29 PROCEDURE — 93005 ELECTROCARDIOGRAM TRACING: CPT

## 2018-10-29 PROCEDURE — 71045 X-RAY EXAM CHEST 1 VIEW: CPT

## 2018-10-29 PROCEDURE — 80053 COMPREHEN METABOLIC PANEL: CPT

## 2018-10-29 PROCEDURE — 85025 COMPLETE CBC W/AUTO DIFF WBC: CPT

## 2018-10-29 PROCEDURE — 99284 EMERGENCY DEPT VISIT MOD MDM: CPT

## 2018-10-29 PROCEDURE — 36415 COLL VENOUS BLD VENIPUNCTURE: CPT

## 2018-10-29 PROCEDURE — 94640 AIRWAY INHALATION TREATMENT: CPT

## 2018-10-29 PROCEDURE — 83880 ASSAY OF NATRIURETIC PEPTIDE: CPT

## 2018-10-29 PROCEDURE — 96374 THER/PROPH/DIAG INJ IV PUSH: CPT

## 2018-10-29 RX ADMIN — BUDESONIDE ONE MG: 0.5 SUSPENSION RESPIRATORY (INHALATION) at 13:51

## 2018-10-29 RX ADMIN — FUROSEMIDE ONE MG: 10 INJECTION, SOLUTION INTRAMUSCULAR; INTRAVENOUS at 14:47

## 2018-10-29 NOTE — DIAGNOSTIC IMAGING REPORT
TERRY SORTO 

[facility]

[address] [facility] [address]

[place], [place]. [address]

 

 

 

 

Report Submission Date: Oct 29, 2018 1:44:38 PM CDT

Patient       Study

Name:   ANASTACIA RONDON       Date:   Oct 29, 2018 1:18:31 PM CDT

MRN:   [id number]       Modality Type:   DX

Gender:   F       Description:   CHEST

:   39       Institution:   [facility]

Physician:   TERRY SORTO

     Accession:    [id number]

 

 

Portable chest 



History:  Difficulty breathing 



Portable chest dated 2018 is compared with September 15, 2018 



The heart is mildly enlarged.  There is mild aortic atherosclerosis.  Pulmonary 
vascularity is normal.  There is no confluent infiltrate or pleural effusion.  
Bilateral total shoulder replacements are present. 



Impression: 



Mild cardiomegaly. 



No active disease.

 

Electronically signed on Oct 29, 2018 1:44:38 PM CDT by:

Marlene RUST

## 2018-10-29 NOTE — ED PHYSICIAN DOCUMENTATION
Dyspnea





- HISTORIAN


Historian: patient





- HPI


Stated Complaint: shortness of breath


Chief Complaint: Dyspnea


Onset: other (per nursing home over the weekend she was noted to have oxygen 

levels in the 80's )


Duration: continues in ED


Initiating Event: other (she has a history of COPD. She has had some vomiting 

today. She also was not wearing her oxygen as prescribed )


Severity: mild


Exacerbated By: nothing


Associated Symptoms: denies: chest pain, chest discomfort, productive cough


Further Comments: yes (Per ambulance staff she was noted to have low oxygen 

levels this weekend (she would not keep her oxygen on as prescribed) she had an 

episode of vomiting and did complain of nausea which has resolved after meds in 

ambulance. She denies any complaints at bedside. She is currently receiving 

breathing treatment. No other complaints no further nausea She is no in 

distress)





- ROS


CONST: no problems





- PAST HX


Lung Disease: COPD


Allergies/Adverse Reactions: 


                                    Allergies











Allergy/AdvReac Type Severity Reaction Status Date / Time


 


No Known Drug Allergies Allergy   Verified 10/29/18 14:40














Home Medications: 


                                Ambulatory Orders











 Medication  Instructions  Recorded


 


Multivitamin [Tab-A-Chandan] 1 each PO DAILY 01/24/15


 


Ranitidine HCl [Zantac] 150 mg PO D 07/26/15


 


Folic Acid [FA-8] 0.8 mg PO D 02/22/16


 


Gabapentin [Neurontin] 300 mg PO BID 08/23/17


 


Acetaminophen [Tylenol] 650 mg PO Q6H PRN  tablet 03/11/18


 


Atorvastatin Calcium 40 mg PO HS #30 tablet 03/11/18


 


Bisacodyl [Dulcolax] 5 - 10 mg PO QD PRN #60 tablet. 03/11/18


 


Docusate Sodium [Colace] 100 mg PO DAILY #30 capsule 03/11/18


 


Ferrous Sulfate [Feosol] 325 mg PO D #30 tablet 03/11/18


 


Magnesium Oxide [Magnesium] 400 mg PO BID #60 tablet 03/11/18


 


Melatonin 5 mg PO PM #30 capsule 03/11/18


 


Metformin HCl [Glucophage] 500 mg PO AS DIRECTED #120 tablet 03/11/18














- SOCIAL HX


Smoking History: non-smoker


Alcohol Use: none


Drug Use: none





- FAMILY HX


Family History: none





- VITAL SIGNS


Vital Signs: 





                                   Vital Signs











Temp Pulse Resp BP Pulse Ox


 


          104/42    


 


          09/15/18 17:27   














- REVIEWED ASSESSMENTS


Nursing Assessment  Reviewed: Yes


Vitals Reviewed: Yes





Progress





- Progress


Progress: 





1345: continues to deny any nausea or pain. Her oxygen levels have improve 

(partial nail polish removed) HOB increased DG 


1430: resting quietly in room - daughter at bedside DG 


1435: Discussed with Dr Garcia and plan will be discussed with daughter DG 


1455: results discussed and plan with pt and daughter agreeable DG











ED Results Lab/Radiology





- Radiology


Radiology Impressions: 





Portable chest 





History: Difficulty breathing 





Portable chest dated October 29, 2018 is compared with September 15, 2018 





The heart is mildly enlarged. There is mild aortic atherosclerosis. Pulmonary 

vascularity is normal. There is no confluent infiltrate or pleural effusion. 

Bilateral total shoulder replacements are present. 





Impression: 





Mild cardiomegaly. 





No active disease. 


Electronically signed on Oct 29, 2018 1:44:38 PM CDT by: 


Marlene Pizarro





Dyspnea Physical Exam





- EXAM


General Appearance: no acute distress, alert


EENT: eye inspection normal, no signs of dehydration


Neck: nml inspection


Respiratory: no resp. distress, wheezes, rhonchi, other (decreased on LLL with 

physical exam )


CVS: reg. rate & rhythm, no murmur


Abdomen: non-tender


Skin: color nml


Extremities: non-tender, normal range of motion


Neuro/Psych: disoriented





Discharge


Clincal Impression: 


Congestive heart failure


Qualifiers:


 Heart failure type: other Qualified Code(s): I50.9 - Heart failure, unspecified





Referrals: 


Johnnie Garcia MD [Primary Care Provider] - 2 Days


Comments: 





1. Lasix daily (todays dose given) 20 mg daily x 5 days


2. Follow up with Dr Garcia in 2-4 days


3. Return to ER for any concerns


4. Meds as prescribed 





Condition: Stable


Disposition: 01 HOME, SELF-CARE


Decision to Admit: NO


Date of Decison to Admit: 10/29/18


Decision Time: 15:08

## 2018-10-31 ENCOUNTER — HOSPITAL ENCOUNTER (EMERGENCY)
Dept: HOSPITAL 44 - ED | Age: 83
Discharge: ANOTHER HOSPITAL | End: 2018-10-31
Payer: MEDICARE

## 2018-10-31 VITALS — DIASTOLIC BLOOD PRESSURE: 31 MMHG | SYSTOLIC BLOOD PRESSURE: 95 MMHG

## 2018-10-31 DIAGNOSIS — E87.5: ICD-10-CM

## 2018-10-31 DIAGNOSIS — J96.21: Primary | ICD-10-CM

## 2018-10-31 DIAGNOSIS — I50.9: ICD-10-CM

## 2018-10-31 DIAGNOSIS — J44.9: ICD-10-CM

## 2018-10-31 LAB
APPEARANCE UR: (no result)
BASOPHILS NFR BLD: 0.3 % (ref 0–1.5)
COLOR,URINE: YELLOW
EOSINOPHIL NFR BLD: 1.2 % (ref 0–6.8)
MCH RBC QN AUTO: 28.8 PG (ref 28–34)
MCV RBC AUTO: 86 FL (ref 80–100)
MONOCYTES %: 6 % (ref 0–11)
NEUTROPHILS #: 10.3 # K/UL (ref 1.4–7.7)
PH UR STRIP: 5 [PH] (ref 5–8)
PROT SERPL-MCNC: 5.3 G/DL (ref 6–8.5)
RBC UR QL: (no result)
UROBILINOGEN URINE: 0.2 EU (ref 0.2–1)

## 2018-10-31 PROCEDURE — 70450 CT HEAD/BRAIN W/O DYE: CPT

## 2018-10-31 PROCEDURE — 83880 ASSAY OF NATRIURETIC PEPTIDE: CPT

## 2018-10-31 PROCEDURE — 51702 INSERT TEMP BLADDER CATH: CPT

## 2018-10-31 PROCEDURE — 96368 THER/DIAG CONCURRENT INF: CPT

## 2018-10-31 PROCEDURE — 85025 COMPLETE CBC W/AUTO DIFF WBC: CPT

## 2018-10-31 PROCEDURE — 81002 URINALYSIS NONAUTO W/O SCOPE: CPT

## 2018-10-31 PROCEDURE — 80053 COMPREHEN METABOLIC PANEL: CPT

## 2018-10-31 PROCEDURE — 84484 ASSAY OF TROPONIN QUANT: CPT

## 2018-10-31 PROCEDURE — 87040 BLOOD CULTURE FOR BACTERIA: CPT

## 2018-10-31 PROCEDURE — S1016 NON-PVC INTRAVENOUS ADMINIST: HCPCS

## 2018-10-31 PROCEDURE — 87186 SC STD MICRODIL/AGAR DIL: CPT

## 2018-10-31 PROCEDURE — 71260 CT THORAX DX C+: CPT

## 2018-10-31 PROCEDURE — 96375 TX/PRO/DX INJ NEW DRUG ADDON: CPT

## 2018-10-31 PROCEDURE — 94640 AIRWAY INHALATION TREATMENT: CPT

## 2018-10-31 PROCEDURE — 99285 EMERGENCY DEPT VISIT HI MDM: CPT

## 2018-10-31 PROCEDURE — 93005 ELECTROCARDIOGRAM TRACING: CPT

## 2018-10-31 PROCEDURE — 96366 THER/PROPH/DIAG IV INF ADDON: CPT

## 2018-10-31 PROCEDURE — 96365 THER/PROPH/DIAG IV INF INIT: CPT

## 2018-10-31 PROCEDURE — 87086 URINE CULTURE/COLONY COUNT: CPT

## 2018-10-31 PROCEDURE — 83605 ASSAY OF LACTIC ACID: CPT

## 2018-10-31 NOTE — ED PHYSICIAN DOCUMENTATION
General Adult





- HISTORIAN


Historian: paramedics, child (daughter Neeru)





- HPI


Stated Complaint: Mental Status Changes


Chief Complaint: General Adult


Additional Information: 





Seen in this ER on 10/29 for respiratory distress, CHF, decreased sat's but 

wouldn't leave oxygen on. Worse today. O2 sats at NH in 80's. EMS gave 2L and 

she normalized. No responsive to verbal stimuli. On arrival in ER, she is not 

responsive to verbal stimuli. Pursed lip and generally labored breathing with 

upper airway noises, and with out specific retractions.  Oral suction relieved 

some of this.  DNR. No other modifying factors or associated signs. 





- ROS


CONST: no problems





- PAST HX


Past History: COPD, CHF


Allergies/Adverse Reactions: 


                                    Allergies











Allergy/AdvReac Type Severity Reaction Status Date / Time


 


No Known Drug Allergies Allergy   Verified 10/29/18 14:40














Home Medications: 


                                Ambulatory Orders











 Medication  Instructions  Recorded


 


Multivitamin [Tab-A-Chandan] 1 each PO DAILY 01/24/15


 


Ranitidine HCl [Zantac] 150 mg PO D 07/26/15


 


Folic Acid [FA-8] 0.8 mg PO D 16


 


Gabapentin [Neurontin] 300 mg PO BID 17


 


Acetaminophen [Tylenol] 650 mg PO Q6H PRN  tablet 18


 


Atorvastatin Calcium 40 mg PO HS #30 tablet 18


 


Bisacodyl [Dulcolax] 5 - 10 mg PO QD PRN #60 tablet. 18


 


Docusate Sodium [Colace] 100 mg PO DAILY #30 capsule 18


 


Ferrous Sulfate [Feosol] 325 mg PO D #30 tablet 18


 


Magnesium Oxide [Magnesium] 400 mg PO BID #60 tablet 18


 


Melatonin 5 mg PO PM #30 capsule 18


 


Metformin HCl [Glucophage] 500 mg PO AS DIRECTED #120 tablet 18














- SOCIAL HX


Smoking History: non-smoker





- FAMILY HX


Family History: No (no signif)





- VITAL SIGNS


Vital Signs: 





                                   Vital Signs











Temp Pulse Resp BP Pulse Ox


 


 97.1 F L  78   38 H  102/73   90 L


 


 10/31/18 08:57  10/31/18 09:20  10/31/18 08:57  10/31/18 08:57  10/31/18 08:57














- REVIEWED ASSESSMENTS


Nursing Assessment  Reviewed: Yes


Vitals Reviewed: Yes





Progress





- Progress


Progress: 








Report Submission Date: Oct 31, 2018 10:55:52 AM CDT


Patient       Study


Name:   ANASTACIA RONDON       Date:   Oct 31, 2018 10:14:32 AM CDT


MRN:   [id number]       Modality Type:   CT\SR


Gender:   F       Description:   CT CHEST W/ CONTRAST


:   39       Institution:   [facility]


Physician:   LUCY BOUCHER - ER


     Accession:    [id number]


 


 


Examination: CT chest 





History: RESPIRATORY DISTRESS PATIENTS POA STATES STARTED ON 10/29/18 AND PROGR

ESSIVELY GETTING WORSE - ALSO HAS N/V (Hx) 





Comparison exams: Plain film dated 2018 





Technique: CT chest with contrast protocol   





Findings: Interstitial prominence, right greater than left. Peripheral 

parenchymal scarring. Few peripheral nodular densities. Mild right lung base 

pleural thickening. Prominent subcarinal and left hilar lymph nodes. Vascular 

calcifications involving the thoracic aorta. No gross aneurysmal dilation. 

Cardiac silhouette not enlarged. No pericardial effusion. Coronary vascular 

calcifications. 





Lower neck structures without gross abnormality. Degenerative changes of the 

thoracic vertebral bodies. Artifact from bilateral shoulder prostheses. Limited 

upper abdominal organ evaluation. Presumed cysts involving the right kidney. 

Neurostimulator. 





Impression: Right greater than left interstitial fullness with small right lower

 lung infiltrate/effusion. Congestive edema versus early infiltrate. 





Subcarinal and left hilar lymph nodes/berry fullness - concerning for neoplasia.

 Correlate with any underlying medical history


 


Electronically signed on Oct 31, 2018 10:55:52 AM CDT by:


Carlos Mckeon





2513, spoke with Yoel, house supervisor at [facility], three times, this time to 

relay Head CT results and chemistry results. He will discuss with Dr. Noel 

and call me back. 





1348, discussed pt wit Olegario Noel, hospitalist at [facility], who accepts pt for 

transfer. Will initiate treatment for hyperkalemia. 





Pt with circumoral and facial cyanosis for last hour. 





Pt discussed with Dr. Garcia x2 during this ER visit. 





ED Results Lab/Radiology





- Orders


Orders: 





                                    ED Orders











 Category Date Time Status


 


 Continuous EKG monitoring Q1H Care  10/31/18 09:20 Active


 


 Place IV Lock 1T Care  10/31/18 09:18 Active


 


 CT CHEST W/ CONTRAST Stat Exams  10/31/18 Ordered


 


 CBC/PLATELET/DIFF Routine Lab  10/31/18 09:15 Received


 


 TROPONIN I (cTnI) Stat Lab  10/31/18 09:15 Ordered


 


 URINALYSIS Routine Lab  10/31/18 09:15 Received


 


 EKG WITH COMPARISON Stat Ther  10/31/18 Ordered














General Adult Physical Exam





- PHYSICAL EXAM


GENERAL APPEARANCE: Puse lipped and labored breathing


EENT: eye inspection normal, other (dry mucosa, lips)


NECK: normal inspection, supple


RESPIRATORY: breath sounds normal (upper airway sounds, RR upper 20's. Pulse ox 

90's with 2L oxygen)


CVS: reg rate & rhythm, heart sounds normal


ABDOMEN: soft, normal bowel sounds, no distension, non-tender


BACK: normal inspection


SKIN: warm/dry, pallor (mild)


EXTREMITIES: no evidence of injury, no edema


NEURO: CN's nml as tested, motor nml, sensation nml, other (not responsive to 

verbal stimuli)





Discharge


Clincal Impression: 


 Hyperkalemia





Respiratory failure


Qualifiers:


 Chronicity: acute on chronic Respiratory failure complication: hypoxia 

Qualified Code(s): J96.21 - Acute and chronic respiratory failure with hypoxia





Referrals: 


Johnnie Garcia MD [Primary Care Provider] - 2 Days


Condition: Critical


Disposition:  XFER [facility]OS[facility]


Decision to Admit: NO


Decision Time: 13:48

## 2018-11-01 NOTE — DIAGNOSTIC IMAGING REPORT
LUCY BOUCHER 

[facility]

[address] [facility] [address] [place] [phone]

 

 

 

 

Report Submission Date: Oct 31, 2018 10:55:52 AM CDT

Patient       Study

Name:   ANASTACIA RONDON       Date:   Oct 31, 2018 10:14:32 AM CDT

MRN:   [id number]       Modality Type:   CT\SR

Gender:   F       Description:   CT CHEST W/ CONTRAST

:   39       Institution:   [facility]

Physician:   LUCY BOUCHER

     Accession:    [id number]

 

 

Examination: CT chest 



History: RESPIRATORY DISTRESS PATIENTS POA STATES STARTED ON 10/29/18 AND 
PROGRESSIVELY GETTING WORSE - ALSO HAS N/V (Hx) 



Comparison exams: Plain film dated 2018 



Technique: CT chest with contrast protocol   



Findings: Interstitial prominence, right greater than left. Peripheral 
parenchymal scarring. Few peripheral nodular densities. Mild right lung base 
pleural thickening. Prominent subcarinal and left hilar lymph nodes. Vascular 
calcifications involving the thoracic aorta. No gross aneurysmal dilation. 
Cardiac silhouette not enlarged. No pericardial effusion. Coronary vascular 
calcifications. 



Lower neck structures without gross abnormality. Degenerative changes of the 
thoracic vertebral bodies. Artifact from bilateral shoulder prostheses. Limited 
upper abdominal organ evaluation. Presumed cysts involving the right kidney. 
Neurostimulator. 



Impression: Right greater than left interstitial fullness with small right lower
lung infiltrate/effusion. Congestive edema versus early infiltrate. 



Subcarinal and left hilar lymph nodes/berry fullness - concerning for neoplasia.
Correlate with any underlying medical history

 

Electronically signed on Oct 31, 2018 10:55:52 AM CDT by:

Carlos RUST

## 2018-11-01 NOTE — DIAGNOSTIC IMAGING REPORT
LUCY BOUCHER 

[facility]

[address] [facility] [address]

[place], [place]. [address]

 

 

 

 

Report Submission Date: Oct 31, 2018 1:21:50 PM CDT

Patient       Study

Name:   ANASTACIA RONDON       Date:   Oct 31, 2018 12:50:56 PM CDT

MRN:   [id number]       Modality Type:   CT\SR

Gender:   F       Description:   CT BRAIN W/O CONTRAST

:   39       Institution:   [facility]

Physician:   LUCY BOUCHER

     Accession:    [id number]

 

 

Examination: CT head without contrast 



History: MENTAL STATUS CHANGES 



Comparison exam: None available 4 direct review. 



Technique: Noncontrast head CT protocol. 



Findings: Ventricles and sulci are prominent. Cerebrocerebellar parenchyma 
demonstrates periventricular low attenuation consistent with small vessel 
disease. No evidence for parenchymal hemorrhage. No evidence for mass or mass 
effect. No midline shift. No extra axial fluid collections. Partial 
visualization of the paranasal sinuses, mastoid air cells, orbits, skull and 
scalp without gross irregularity. Streak artifact from dental hardware. 



Impression: Age related changes. No acute parenchymal process. No hemorrhage.

 

Electronically signed on Oct 31, 2018 1:21:50 PM CDT by:

Carlos RUST

## 2018-11-13 ENCOUNTER — HOSPITAL ENCOUNTER (EMERGENCY)
Dept: HOSPITAL 44 - ED | Age: 83
Discharge: HOME | End: 2018-11-13
Payer: MEDICARE

## 2018-11-13 VITALS — DIASTOLIC BLOOD PRESSURE: 55 MMHG | SYSTOLIC BLOOD PRESSURE: 142 MMHG

## 2018-11-13 DIAGNOSIS — R41.82: Primary | ICD-10-CM

## 2018-11-13 DIAGNOSIS — J44.9: ICD-10-CM

## 2018-11-13 LAB
APPEARANCE UR: CLEAR
BASE EXCESS STD BLDA CALC-SCNC: 8.2 MMOL/L
BASOPHILS NFR BLD: 0.4 % (ref 0–1.5)
COLOR,URINE: YELLOW
EGFR (NON-AFRICAN): 42
EOSINOPHIL NFR BLD: 3.2 % (ref 0–6.8)
MCH RBC QN AUTO: 28.4 PG (ref 28–34)
MCV RBC AUTO: 86 FL (ref 80–100)
MONOCYTES %: 6.1 % (ref 0–11)
NEUTROPHILS #: 4.2 # K/UL (ref 1.4–7.7)
PCO2 BLDA: 44 MMHG (ref 35–48)
PH BLDA: 7.48 [PH] (ref 7.35–7.45)
PH UR STRIP: 8.5 [PH] (ref 5–8)
PO2 BLDA: 47 MMHG (ref 83–108)
RBC UR QL: NEGATIVE
SAO2 % BLDA FROM PO2: 84 % (ref 93–100)
UROBILINOGEN URINE: 0.2 EU (ref 0.2–1)

## 2018-11-13 PROCEDURE — S1016 NON-PVC INTRAVENOUS ADMINIST: HCPCS

## 2018-11-13 PROCEDURE — 80053 COMPREHEN METABOLIC PANEL: CPT

## 2018-11-13 PROCEDURE — 96366 THER/PROPH/DIAG IV INF ADDON: CPT

## 2018-11-13 PROCEDURE — 85025 COMPLETE CBC W/AUTO DIFF WBC: CPT

## 2018-11-13 PROCEDURE — 81002 URINALYSIS NONAUTO W/O SCOPE: CPT

## 2018-11-13 PROCEDURE — 36600 WITHDRAWAL OF ARTERIAL BLOOD: CPT

## 2018-11-13 PROCEDURE — 71045 X-RAY EXAM CHEST 1 VIEW: CPT

## 2018-11-13 PROCEDURE — 70450 CT HEAD/BRAIN W/O DYE: CPT

## 2018-11-13 PROCEDURE — 83880 ASSAY OF NATRIURETIC PEPTIDE: CPT

## 2018-11-13 PROCEDURE — 99283 EMERGENCY DEPT VISIT LOW MDM: CPT

## 2018-11-13 PROCEDURE — 96365 THER/PROPH/DIAG IV INF INIT: CPT

## 2018-11-13 PROCEDURE — 82803 BLOOD GASES ANY COMBINATION: CPT

## 2018-11-13 NOTE — DIAGNOSTIC IMAGING REPORT
TERRY SORTO 

[facility]

[address] [facility] [address]

[place], [place]. [address]

 

 

 

 

Report Submission Date: 2018 1:59:27 PM CST

Patient       Study

Name:   ANASTACIA RONDON       Date:   2018 1:26:40 PM CST

MRN:   [id number]       Modality Type:   DX

Gender:   F       Description:   CHEST

:   39       Institution:   [facility]

Physician:   TERRY SORTO

     Accession:    [id number]

 

 

Examination: Portable chest 



History: Evaluate lungs COUGH TODAY. 



Comparison exam: 15 September 2018 



Findings: Single view of the chest demonstrates a normal cardiac and mediastinal
silhouette. Tortuous aorta with vascular calcifications. Chronic interstitial 
changes. Lung fields without focal infiltrate. No blunting of the costophrenic 
margins. Bilateral shoulder replacement. Spinal stimulator wires.



Impression: Chronic interstitial changes. No acute appearing pulmonary process.

 

Electronically signed on 2018 1:59:27 PM CST by:

Carlos RUST

## 2018-11-13 NOTE — ED PHYSICIAN DOCUMENTATION
General Adult





- HISTORIAN


Historian: patient





- HPI


Stated Complaint: alterted mental status - improved now


Chief Complaint: Altered Mental Status


Onset: other (this has been going on for a while )


Timing: better


Severity: mild


Further Comments: yes (Nursing home called 911 saying pt was not responsive at 

all - EMT states pt was responsive when they arrived. Per staff this has been an

issue since her [facility] admission. She has had times of confusion and 

times of lucid thoughts. She has no new injury. She is now talking with no 

confusion and daughter wants lab to compare to her inpt stay.)


Last known Well Code/Unknown Code: Unknown





- ROS


CONST: no problems





- PAST HX


Past History: COPD


Immunizations: UTD


Allergies/Adverse Reactions: 


                                    Allergies











Allergy/AdvReac Type Severity Reaction Status Date / Time


 


No Known Drug Allergies Allergy   Verified 11/13/18 15:46














Home Medications: 


                                Ambulatory Orders











 Medication  Instructions  Recorded


 


Multivitamin [Tab-A-Chandan] 1 each PO DAILY 01/24/15


 


Ranitidine HCl [Zantac] 150 mg PO D 07/26/15


 


Folic Acid [FA-8] 0.8 mg PO D 02/22/16


 


Gabapentin [Neurontin] 300 mg PO BID 08/23/17


 


Acetaminophen [Tylenol] 650 mg PO Q6H PRN  tablet 03/11/18


 


Atorvastatin Calcium 40 mg PO HS #30 tablet 03/11/18


 


Bisacodyl [Dulcolax] 5 - 10 mg PO QD PRN #60 tablet. 03/11/18


 


Docusate Sodium [Colace] 100 mg PO DAILY #30 capsule 03/11/18


 


Ferrous Sulfate [Feosol] 325 mg PO D #30 tablet 03/11/18


 


Magnesium Oxide [Magnesium] 400 mg PO BID #60 tablet 03/11/18


 


Melatonin 5 mg PO PM #30 capsule 03/11/18


 


Metformin HCl [Glucophage] 500 mg PO AS DIRECTED #120 tablet 03/11/18














- SOCIAL HX


Smoking History: non-smoker


Alcohol Use: none


Drug Use: none





- FAMILY HX


Family History: No





- VITAL SIGNS


Vital Signs: 





                                   Vital Signs











Temp Pulse Resp BP Pulse Ox


 


          95/31    


 


          10/31/18 14:12   














- REVIEWED ASSESSMENTS


Nursing Assessment  Reviewed: Yes


Vitals Reviewed: Yes





Progress





- Progress


Progress: 





1415: discussed results with Dr Garcia - he is in room with pt and daughter DG 





ED Results Lab/Radiology





- Radiology


Radiology Impressions: 





Examination: CT head without contrast 





History: ALTERED MENTAL STATUS TODAY. UNRESPONSIVE THIS MORNING. HX OF RENAL 

FAILURE, SEPSIS, AND SEVERE ENCEPHALOPATHY. (Hx) 





Comparison exam: 31 October 2018 





Technique: Noncontrast head CT protocol. 





Findings: Ventricles and sulci are prominent, though stable. Cerebrocerebellar 

parenchyma demonstrates periventricular low attenuation consistent with small 

vessel disease: also stable. No evidence for parenchymal hemorrhage. No evidence

 for mass or mass effect. No midline shift. No extra axial fluid collections. 

Partial visualization of the paranasal sinuses, mastoid air cells, orbits, skull

 and scalp without gross irregularity. 





Impression: Stable age related changes. No acute parenchymal process. No 

hemorrhage. 


Electronically signed on Nov 13, 2018 1:56:37 PM CST by: 


Carlos Mckeon











Examination: Portable chest 





History: Evaluate lungs COUGH TODAY. 





Comparison exam: 15 September 2018 





Findings: Single view of the chest demonstrates a normal cardiac and mediastinal

 silhouette. Tortuous aorta with vascular calcifications. Chronic interstitial 

changes. Lung fields without focal infiltrate. No blunting of the costophrenic 

margins. Bilateral shoulder replacement. Spinal stimulator wires. 





Impression: Chronic interstitial changes. No acute appearing pulmonary process.





 








Electronically signed on Nov 13, 2018 1:59:27 PM CST by:





Carlos Mckeon





General Adult Physical Exam





- PHYSICAL EXAM


GENERAL APPEARANCE: no distress


EENT: eye inspection normal, ENT inspection normal


NECK: normal inspection


RESPIRATORY: no resp distress, chest non-tender, wheezes


CVS: reg rate & rhythm, heart sounds normal


ABDOMEN: soft, no distension


BACK: normal inspection


SKIN: warm/dry, normal color


EXTREMITIES: non-tender, normal range of motion, no evidence of injury, no edema


NEURO: oriented X3





Discharge


Clincal Impression: 


 Mental confusion





Referrals: 


Johnnie Garcia MD [Primary Care Provider] - 2 Days


Comments: 





1. Return to Nursing Home 


2. No new orders


3. Follow up with PCP in 2-4 days if no improvement


4. Return to ER for any concerns 


Condition: Stable


Disposition: 01 HOME, SELF-CARE


Decision to Admit: NO


Date of Decison to Admit: 11/13/18


Decision Time: 14:56

## 2018-11-13 NOTE — DIAGNOSTIC IMAGING REPORT
TERRY SORTO 

[facility]

[address] [address] beds at the [facility] [address]

[place], [place]. [address]

 

 

 

 

Report Submission Date: 2018 1:56:37 PM CST

Patient       Study

Name:   ANASTACIA RONDON       Date:   2018 1:28:44 PM CST

MRN:   [id number]       Modality Type:   CT

Gender:   F       Description:   CT BRAIN W/O CONTRAST

:   39       Institution:   [facility]

Physician:   TERRY SORTO

     Accession:    [id number]

 

 

Examination: CT head without contrast 



History: ALTERED MENTAL STATUS TODAY. UNRESPONSIVE THIS MORNING. HX OF RENAL 
FAILURE, SEPSIS, AND SEVERE ENCEPHALOPATHY. (Hx) 



Comparison exam: 2018 



Technique: Noncontrast head CT protocol. 



Findings: Ventricles and sulci are prominent, though stable. Cerebrocerebellar 
parenchyma demonstrates periventricular low attenuation consistent with small 
vessel disease: also stable. No evidence for parenchymal hemorrhage. No evidence
for mass or mass effect. No midline shift. No extra axial fluid collections. 
Partial visualization of the paranasal sinuses, mastoid air cells, orbits, skull
and scalp without gross irregularity. 



Impression: Stable age related changes. No acute parenchymal process. No 
hemorrhage.

 

Electronically signed on 2018 1:56:37 PM CST by:

Carlos RUST